# Patient Record
Sex: FEMALE | Race: WHITE | NOT HISPANIC OR LATINO | Employment: FULL TIME | URBAN - METROPOLITAN AREA
[De-identification: names, ages, dates, MRNs, and addresses within clinical notes are randomized per-mention and may not be internally consistent; named-entity substitution may affect disease eponyms.]

---

## 2023-05-26 PROBLEM — Z86.59 HISTORY OF OCD (OBSESSIVE COMPULSIVE DISORDER): Status: ACTIVE | Noted: 2023-05-26

## 2023-05-26 PROBLEM — E66.3 OVERWEIGHT (BMI 25.0-29.9): Status: ACTIVE | Noted: 2023-05-26

## 2023-05-26 PROBLEM — Q23.1 BICUSPID AORTIC VALVE (HHS-HCC): Status: ACTIVE | Noted: 2023-05-26

## 2023-05-26 PROBLEM — J30.2 SEASONAL ALLERGIES: Status: ACTIVE | Noted: 2023-05-26

## 2023-05-26 PROBLEM — F41.8 ANXIETY ASSOCIATED WITH DEPRESSION: Status: ACTIVE | Noted: 2023-05-26

## 2023-05-26 PROBLEM — N93.9 ABNORMAL UTERINE BLEEDING: Status: ACTIVE | Noted: 2023-05-26

## 2023-05-26 PROBLEM — Q23.81 BICUSPID AORTIC VALVE: Status: ACTIVE | Noted: 2023-05-26

## 2023-05-26 RX ORDER — BUSPIRONE HYDROCHLORIDE 10 MG/1
1 TABLET ORAL EVERY 12 HOURS
COMMUNITY
End: 2023-12-05 | Stop reason: WASHOUT

## 2023-05-26 RX ORDER — LAMOTRIGINE 150 MG/1
1 TABLET ORAL DAILY
COMMUNITY
End: 2023-12-05 | Stop reason: WASHOUT

## 2023-05-26 RX ORDER — IBUPROFEN 600 MG/1
TABLET ORAL
COMMUNITY
End: 2024-04-25 | Stop reason: WASHOUT

## 2023-05-26 RX ORDER — NORETHINDRONE ACETATE AND ETHINYL ESTRADIOL 1.5-30(21)
KIT ORAL
COMMUNITY
Start: 2022-08-02 | End: 2023-12-05 | Stop reason: WASHOUT

## 2023-05-26 RX ORDER — ARIPIPRAZOLE 20 MG/1
20 TABLET ORAL DAILY
COMMUNITY
End: 2023-12-05 | Stop reason: WASHOUT

## 2023-05-26 RX ORDER — BUPROPION HYDROCHLORIDE 300 MG/1
1 TABLET ORAL DAILY
COMMUNITY
End: 2023-12-05 | Stop reason: WASHOUT

## 2023-05-26 RX ORDER — LORATADINE 10 MG/1
2 TABLET ORAL DAILY
COMMUNITY

## 2023-05-26 RX ORDER — FLUOXETINE HYDROCHLORIDE 40 MG/1
2 CAPSULE ORAL DAILY
COMMUNITY
End: 2023-11-15 | Stop reason: WASHOUT

## 2023-05-26 RX ORDER — MULTIVITAMIN
1 TABLET ORAL DAILY
COMMUNITY

## 2023-06-05 ENCOUNTER — OFFICE VISIT (OUTPATIENT)
Dept: PRIMARY CARE | Facility: CLINIC | Age: 38
End: 2023-06-05
Payer: COMMERCIAL

## 2023-06-05 VITALS
SYSTOLIC BLOOD PRESSURE: 110 MMHG | HEART RATE: 62 BPM | RESPIRATION RATE: 20 BRPM | BODY MASS INDEX: 29.77 KG/M2 | DIASTOLIC BLOOD PRESSURE: 68 MMHG | HEIGHT: 63 IN | WEIGHT: 168 LBS

## 2023-06-05 DIAGNOSIS — Z00.00 ROUTINE GENERAL MEDICAL EXAMINATION AT A HEALTH CARE FACILITY: Primary | ICD-10-CM

## 2023-06-05 DIAGNOSIS — Z13.29 SCREENING FOR THYROID DISORDER: ICD-10-CM

## 2023-06-05 DIAGNOSIS — E66.3 OVERWEIGHT WITH BODY MASS INDEX (BMI) OF 29 TO 29.9 IN ADULT: ICD-10-CM

## 2023-06-05 DIAGNOSIS — Q23.1 BICUSPID AORTIC VALVE (HHS-HCC): ICD-10-CM

## 2023-06-05 DIAGNOSIS — J30.2 SEASONAL ALLERGIES: ICD-10-CM

## 2023-06-05 DIAGNOSIS — F41.8 ANXIETY ASSOCIATED WITH DEPRESSION: ICD-10-CM

## 2023-06-05 DIAGNOSIS — Z13.1 SCREENING FOR DIABETES MELLITUS: ICD-10-CM

## 2023-06-05 DIAGNOSIS — Z86.59 HISTORY OF OCD (OBSESSIVE COMPULSIVE DISORDER): ICD-10-CM

## 2023-06-05 DIAGNOSIS — Z13.220 SCREENING FOR LIPID DISORDERS: ICD-10-CM

## 2023-06-05 PROBLEM — N93.9 ABNORMAL UTERINE BLEEDING: Status: RESOLVED | Noted: 2023-05-26 | Resolved: 2023-06-05

## 2023-06-05 PROCEDURE — 3008F BODY MASS INDEX DOCD: CPT | Performed by: INTERNAL MEDICINE

## 2023-06-05 PROCEDURE — 99395 PREV VISIT EST AGE 18-39: CPT | Performed by: INTERNAL MEDICINE

## 2023-06-05 PROCEDURE — 1036F TOBACCO NON-USER: CPT | Performed by: INTERNAL MEDICINE

## 2023-06-05 ASSESSMENT — ENCOUNTER SYMPTOMS
CHOKING: 0
ARTHRALGIAS: 1
NECK PAIN: 0
DIFFICULTY URINATING: 0
CONSTIPATION: 1
COLOR CHANGE: 0
FREQUENCY: 0
WEAKNESS: 0
HYPERACTIVE: 0
BACK PAIN: 0
RECTAL PAIN: 0
APNEA: 0
DIARRHEA: 0
OCCASIONAL FEELINGS OF UNSTEADINESS: 0
PALPITATIONS: 0
SEIZURES: 0
NECK STIFFNESS: 0
ABDOMINAL DISTENTION: 0
LOSS OF SENSATION IN FEET: 0
VOICE CHANGE: 0
DECREASED CONCENTRATION: 0
BLOOD IN STOOL: 0
STRIDOR: 0
SLEEP DISTURBANCE: 0
WOUND: 0
DYSURIA: 0
NUMBNESS: 0
APPETITE CHANGE: 0
EYE ITCHING: 0
NERVOUS/ANXIOUS: 1
MYALGIAS: 0
COUGH: 0
SPEECH DIFFICULTY: 0
DEPRESSION: 0
FEVER: 0
BRUISES/BLEEDS EASILY: 0
EYE REDNESS: 0
RHINORRHEA: 0
UNEXPECTED WEIGHT CHANGE: 1
SHORTNESS OF BREATH: 0
FACIAL SWELLING: 0
ADENOPATHY: 0
FACIAL ASYMMETRY: 0
SORE THROAT: 0
DIAPHORESIS: 0
DIZZINESS: 0
SINUS PRESSURE: 0
AGITATION: 0
POLYDIPSIA: 0
JOINT SWELLING: 0
NAUSEA: 0
TROUBLE SWALLOWING: 0
ABDOMINAL PAIN: 0
WHEEZING: 0
EYE PAIN: 0
EYE DISCHARGE: 0
DYSPHORIC MOOD: 1
CONFUSION: 0
HEMATURIA: 0
CHILLS: 0
LIGHT-HEADEDNESS: 0
ACTIVITY CHANGE: 0
HEADACHES: 0
POLYPHAGIA: 0
CHEST TIGHTNESS: 0
TREMORS: 0
PHOTOPHOBIA: 0
FATIGUE: 0
VOMITING: 0
HALLUCINATIONS: 0
FLANK PAIN: 0
ANAL BLEEDING: 0
SINUS PAIN: 0

## 2023-06-05 ASSESSMENT — ACTIVITIES OF DAILY LIVING (ADL)
BATHING: INDEPENDENT
DOING_HOUSEWORK: INDEPENDENT
DRESSING: INDEPENDENT
TAKING_MEDICATION: INDEPENDENT
MANAGING_FINANCES: INDEPENDENT
GROCERY_SHOPPING: INDEPENDENT

## 2023-06-05 ASSESSMENT — PATIENT HEALTH QUESTIONNAIRE - PHQ9
1. LITTLE INTEREST OR PLEASURE IN DOING THINGS: NOT AT ALL
2. FEELING DOWN, DEPRESSED OR HOPELESS: NOT AT ALL
SUM OF ALL RESPONSES TO PHQ9 QUESTIONS 1 AND 2: 0

## 2023-06-05 NOTE — ASSESSMENT & PLAN NOTE
Encouraged patient to work on healthy diet and regular exercise  Likely weight gain is secondary to changes in psych med doses

## 2023-06-05 NOTE — ASSESSMENT & PLAN NOTE
Follows with psych  Had recent adjustments in last 1 month due to some suicidal ideations; these have since resolved

## 2023-06-05 NOTE — PROGRESS NOTES
Subjective   Patient ID: Monae aBll is a 38 y.o. female who presents for Annual Exam.    HPI  Pt here for full physical.  She hasn't been eating as well and is up in weight. She does not exercise more than walks at times.      She sees psych for her mood disorders.  She has visits every 3-4 months.  She had her doses adjusted with the Abilify going up.  She was having suicidal thoughts before dose adjustment.  This occurred about 1 month ago.  She is feeling better now with no further thoughts.      She sees Dr. Adler.  She has upcoming visit as last seen 8/2022.  She last had pap in 2021. No current GYN issues.  No breast issues.      Review of Systems   Constitutional:  Positive for unexpected weight change. Negative for activity change, appetite change, chills, diaphoresis, fatigue and fever.   HENT:  Negative for congestion, dental problem, drooling, ear discharge, ear pain, facial swelling, hearing loss, mouth sores, nosebleeds, postnasal drip, rhinorrhea, sinus pressure, sinus pain, sneezing, sore throat, tinnitus, trouble swallowing and voice change.    Eyes:  Positive for visual disturbance (wears glasses-had recent exam). Negative for photophobia, pain, discharge, redness and itching.   Respiratory:  Negative for apnea, cough, choking, chest tightness, shortness of breath, wheezing and stridor.    Cardiovascular:  Negative for chest pain, palpitations and leg swelling.   Gastrointestinal:  Positive for constipation. Negative for abdominal distention, abdominal pain, anal bleeding, blood in stool, diarrhea, nausea, rectal pain and vomiting.   Endocrine: Negative for cold intolerance, heat intolerance, polydipsia, polyphagia and polyuria.   Genitourinary:  Negative for decreased urine volume, difficulty urinating, dyspareunia, dysuria, enuresis, flank pain, frequency, genital sores, hematuria, menstrual problem, pelvic pain, urgency, vaginal bleeding, vaginal discharge and vaginal pain.  "  Musculoskeletal:  Positive for arthralgias (wrists at times). Negative for back pain, gait problem, joint swelling, myalgias, neck pain and neck stiffness.   Skin:  Negative for color change, pallor, rash and wound.   Allergic/Immunologic: Positive for environmental allergies. Negative for food allergies and immunocompromised state.   Neurological:  Negative for dizziness, tremors, seizures, syncope, facial asymmetry, speech difficulty, weakness, light-headedness, numbness and headaches.   Hematological:  Negative for adenopathy. Does not bruise/bleed easily.   Psychiatric/Behavioral:  Positive for dysphoric mood. Negative for agitation, behavioral problems, confusion, decreased concentration, hallucinations, self-injury, sleep disturbance and suicidal ideas. The patient is nervous/anxious. The patient is not hyperactive.        Objective   /68   Pulse 62   Resp 20   Ht 1.6 m (5' 3\")   Wt 76.2 kg (168 lb)   BMI 29.76 kg/m²    Physical Exam  Constitutional:       Appearance: Normal appearance.   HENT:      Head: Normocephalic and atraumatic.      Right Ear: Tympanic membrane, ear canal and external ear normal. There is no impacted cerumen.      Left Ear: Tympanic membrane, ear canal and external ear normal. There is no impacted cerumen.      Nose: Nose normal. No congestion or rhinorrhea.      Mouth/Throat:      Mouth: Mucous membranes are moist.      Pharynx: Oropharynx is clear. No oropharyngeal exudate or posterior oropharyngeal erythema.   Eyes:      Extraocular Movements: Extraocular movements intact.      Conjunctiva/sclera: Conjunctivae normal.      Pupils: Pupils are equal, round, and reactive to light.   Neck:      Vascular: No carotid bruit.   Cardiovascular:      Rate and Rhythm: Normal rate and regular rhythm.      Pulses: Normal pulses.      Heart sounds: Normal heart sounds. No murmur heard.  Pulmonary:      Effort: Pulmonary effort is normal. No respiratory distress.      Breath sounds: " Normal breath sounds. No wheezing, rhonchi or rales.   Abdominal:      General: Abdomen is flat. Bowel sounds are normal. There is no distension.      Palpations: Abdomen is soft.      Tenderness: There is no abdominal tenderness.      Hernia: No hernia is present.   Musculoskeletal:         General: No swelling or tenderness. Normal range of motion.      Cervical back: Normal range of motion and neck supple.      Right lower leg: No edema.      Left lower leg: No edema.   Lymphadenopathy:      Cervical: No cervical adenopathy.   Skin:     General: Skin is warm and dry.      Findings: No lesion or rash.   Neurological:      General: No focal deficit present.      Mental Status: She is alert and oriented to person, place, and time.      Cranial Nerves: No cranial nerve deficit.      Sensory: No sensory deficit.      Motor: No weakness.   Psychiatric:         Mood and Affect: Mood normal.         Behavior: Behavior normal.         Thought Content: Thought content normal.         Judgment: Judgment normal.         Assessment/Plan   Problem List Items Addressed This Visit          Circulatory    Bicuspid aortic valve       Endocrine/Metabolic    Overweight with body mass index (BMI) of 29 to 29.9 in adult     Encouraged patient to work on healthy diet and regular exercise  Likely weight gain is secondary to changes in psych med doses            Other    Anxiety associated with depression     Follows with psych  Had recent adjustments in last 1 month due to some suicidal ideations; these have since resolved          History of OCD (obsessive compulsive disorder)    Seasonal allergies     Other Visit Diagnoses       Routine general medical examination at a health care facility    -  Primary    Relevant Orders    CBC    Comprehensive Metabolic Panel    Urinalysis with Reflex Microscopic    Follow Up In Primary Care    Screening for lipid disorders        Relevant Orders    Lipid Panel    Screening for thyroid disorder         Relevant Orders    Thyroid Stimulating Hormone    Screening for diabetes mellitus        Relevant Orders    Hemoglobin A1C

## 2023-06-05 NOTE — PATIENT INSTRUCTIONS
Start to watch your diet more closely and start to exercise with goal of 150 minutes/week  Weight gain is most likely due to dose adjustments on the psych meds; your mental healthy however is much more important right now so continue to discuss this with psych  See GYN annually (due in August)   Get blood work and urine testing soon-please fast-no food after midnight can have water/black coffee/tea and medications in AM  Follow up here in 1 year-sooner if needed

## 2023-06-17 ENCOUNTER — LAB (OUTPATIENT)
Dept: LAB | Facility: LAB | Age: 38
End: 2023-06-17
Payer: COMMERCIAL

## 2023-06-17 DIAGNOSIS — Z13.29 SCREENING FOR THYROID DISORDER: ICD-10-CM

## 2023-06-17 DIAGNOSIS — Z13.1 SCREENING FOR DIABETES MELLITUS: ICD-10-CM

## 2023-06-17 DIAGNOSIS — Z00.00 ROUTINE GENERAL MEDICAL EXAMINATION AT A HEALTH CARE FACILITY: ICD-10-CM

## 2023-06-17 DIAGNOSIS — Z13.220 SCREENING FOR LIPID DISORDERS: ICD-10-CM

## 2023-06-17 LAB
ALANINE AMINOTRANSFERASE (SGPT) (U/L) IN SER/PLAS: 11 U/L (ref 7–45)
ALBUMIN (G/DL) IN SER/PLAS: 4.3 G/DL (ref 3.4–5)
ALKALINE PHOSPHATASE (U/L) IN SER/PLAS: 66 U/L (ref 33–110)
ANION GAP IN SER/PLAS: 16 MMOL/L (ref 10–20)
APPEARANCE, URINE: ABNORMAL
ASPARTATE AMINOTRANSFERASE (SGOT) (U/L) IN SER/PLAS: 17 U/L (ref 9–39)
BACTERIA, URINE: ABNORMAL /HPF
BILIRUBIN TOTAL (MG/DL) IN SER/PLAS: 0.4 MG/DL (ref 0–1.2)
BILIRUBIN, URINE: NEGATIVE
BLOOD, URINE: NEGATIVE
CALCIUM (MG/DL) IN SER/PLAS: 9.2 MG/DL (ref 8.6–10.6)
CARBON DIOXIDE, TOTAL (MMOL/L) IN SER/PLAS: 23 MMOL/L (ref 21–32)
CHLORIDE (MMOL/L) IN SER/PLAS: 104 MMOL/L (ref 98–107)
CHOLESTEROL (MG/DL) IN SER/PLAS: 183 MG/DL (ref 0–199)
CHOLESTEROL IN HDL (MG/DL) IN SER/PLAS: 53.3 MG/DL
CHOLESTEROL/HDL RATIO: 3.4
COLOR, URINE: YELLOW
CREATININE (MG/DL) IN SER/PLAS: 0.79 MG/DL (ref 0.5–1.05)
ERYTHROCYTE DISTRIBUTION WIDTH (RATIO) BY AUTOMATED COUNT: 12.3 % (ref 11.5–14.5)
ERYTHROCYTE MEAN CORPUSCULAR HEMOGLOBIN CONCENTRATION (G/DL) BY AUTOMATED: 33.2 G/DL (ref 32–36)
ERYTHROCYTE MEAN CORPUSCULAR VOLUME (FL) BY AUTOMATED COUNT: 94 FL (ref 80–100)
ERYTHROCYTES (10*6/UL) IN BLOOD BY AUTOMATED COUNT: 4.5 X10E12/L (ref 4–5.2)
GFR FEMALE: >90 ML/MIN/1.73M2
GLUCOSE (MG/DL) IN SER/PLAS: 83 MG/DL (ref 74–99)
GLUCOSE, URINE: NEGATIVE MG/DL
HEMATOCRIT (%) IN BLOOD BY AUTOMATED COUNT: 42.2 % (ref 36–46)
HEMOGLOBIN (G/DL) IN BLOOD: 14 G/DL (ref 12–16)
KETONES, URINE: NEGATIVE MG/DL
LDL: 102 MG/DL (ref 0–99)
LEUKOCYTE ESTERASE, URINE: ABNORMAL
LEUKOCYTES (10*3/UL) IN BLOOD BY AUTOMATED COUNT: 8.7 X10E9/L (ref 4.4–11.3)
NITRITE, URINE: NEGATIVE
NRBC (PER 100 WBCS) BY AUTOMATED COUNT: 0 /100 WBC (ref 0–0)
PH, URINE: 6 (ref 5–8)
PLATELETS (10*3/UL) IN BLOOD AUTOMATED COUNT: 285 X10E9/L (ref 150–450)
POTASSIUM (MMOL/L) IN SER/PLAS: 4.2 MMOL/L (ref 3.5–5.3)
PROTEIN TOTAL: 6.8 G/DL (ref 6.4–8.2)
PROTEIN, URINE: NEGATIVE MG/DL
RBC, URINE: <1 /HPF (ref 0–5)
SODIUM (MMOL/L) IN SER/PLAS: 139 MMOL/L (ref 136–145)
SPECIFIC GRAVITY, URINE: 1.01 (ref 1–1.03)
SQUAMOUS EPITHELIAL CELLS, URINE: 15 /HPF
THYROTROPIN (MIU/L) IN SER/PLAS BY DETECTION LIMIT <= 0.05 MIU/L: 1.54 MIU/L (ref 0.44–3.98)
TRIGLYCERIDE (MG/DL) IN SER/PLAS: 140 MG/DL (ref 0–149)
UREA NITROGEN (MG/DL) IN SER/PLAS: 11 MG/DL (ref 6–23)
UROBILINOGEN, URINE: <2 MG/DL (ref 0–1.9)
VLDL: 28 MG/DL (ref 0–40)
WBC, URINE: 3 /HPF (ref 0–5)

## 2023-06-17 PROCEDURE — 80053 COMPREHEN METABOLIC PANEL: CPT

## 2023-06-17 PROCEDURE — 84443 ASSAY THYROID STIM HORMONE: CPT

## 2023-06-17 PROCEDURE — 83036 HEMOGLOBIN GLYCOSYLATED A1C: CPT

## 2023-06-17 PROCEDURE — 36415 COLL VENOUS BLD VENIPUNCTURE: CPT

## 2023-06-17 PROCEDURE — 85027 COMPLETE CBC AUTOMATED: CPT

## 2023-06-17 PROCEDURE — 81001 URINALYSIS AUTO W/SCOPE: CPT

## 2023-06-17 PROCEDURE — 80061 LIPID PANEL: CPT

## 2023-06-19 LAB
ESTIMATED AVERAGE GLUCOSE FOR HBA1C: 97 MG/DL
HEMOGLOBIN A1C/HEMOGLOBIN TOTAL IN BLOOD: 5 %

## 2023-11-03 PROBLEM — N93.9 ABNORMAL UTERINE BLEEDING: Status: ACTIVE | Noted: 2023-11-03

## 2023-11-03 PROBLEM — F32.0 CURRENT MILD EPISODE OF MAJOR DEPRESSIVE DISORDER (CMS-HCC): Status: ACTIVE | Noted: 2023-11-03

## 2023-11-03 PROBLEM — F41.1 GENERALIZED ANXIETY DISORDER: Status: ACTIVE | Noted: 2017-03-24

## 2023-11-03 PROBLEM — R46.81 OBSESSIVE-COMPULSIVE BEHAVIOR: Status: ACTIVE | Noted: 2023-11-03

## 2023-11-03 PROBLEM — E66.3 OVERWEIGHT (BMI 25.0-29.9): Status: ACTIVE | Noted: 2023-11-03

## 2023-11-03 PROBLEM — F32.9 MAJOR DEPRESSION: Status: ACTIVE | Noted: 2023-11-03

## 2023-11-03 RX ORDER — LORATADINE 10 MG/1
10 TABLET ORAL
COMMUNITY
Start: 2014-09-29

## 2023-11-03 RX ORDER — BUPROPION HYDROCHLORIDE 150 MG/1
1 TABLET ORAL DAILY
COMMUNITY
Start: 2015-12-01 | End: 2023-12-05 | Stop reason: WASHOUT

## 2023-11-03 RX ORDER — FLUOXETINE HYDROCHLORIDE 20 MG/1
2 CAPSULE ORAL DAILY
COMMUNITY
Start: 2016-08-24 | End: 2023-11-15 | Stop reason: SDUPTHER

## 2023-11-03 RX ORDER — LAMOTRIGINE 100 MG/1
100 TABLET ORAL 2 TIMES DAILY
COMMUNITY
Start: 2020-10-27 | End: 2023-12-05 | Stop reason: WASHOUT

## 2023-11-03 RX ORDER — BUPROPION HYDROCHLORIDE 75 MG/1
75 TABLET ORAL
COMMUNITY
Start: 2016-01-14 | End: 2023-12-05 | Stop reason: WASHOUT

## 2023-11-03 RX ORDER — FLUOXETINE HYDROCHLORIDE 40 MG/1
80 CAPSULE ORAL
COMMUNITY
Start: 2020-10-27 | End: 2023-11-15 | Stop reason: WASHOUT

## 2023-11-03 RX ORDER — IBUPROFEN 600 MG/1
600 TABLET ORAL
COMMUNITY
Start: 2013-11-17 | End: 2024-04-25 | Stop reason: WASHOUT

## 2023-11-03 RX ORDER — NORGESTIMATE AND ETHINYL ESTRADIOL 0.25-0.035
1 KIT ORAL
COMMUNITY
Start: 2020-12-22 | End: 2023-12-05 | Stop reason: WASHOUT

## 2023-11-03 RX ORDER — NORGESTIMATE AND ETHINYL ESTRADIOL 0.25-0.035
1 KIT ORAL
COMMUNITY
Start: 2015-06-30 | End: 2023-12-05 | Stop reason: WASHOUT

## 2023-11-03 RX ORDER — BUPROPION HYDROCHLORIDE 300 MG/1
300 TABLET ORAL
COMMUNITY
Start: 2020-10-27 | End: 2023-12-05 | Stop reason: WASHOUT

## 2023-11-08 ENCOUNTER — APPOINTMENT (OUTPATIENT)
Dept: BEHAVIORAL HEALTH | Facility: CLINIC | Age: 38
End: 2023-11-08
Payer: COMMERCIAL

## 2023-11-15 ENCOUNTER — PHARMACY VISIT (OUTPATIENT)
Dept: PHARMACY | Facility: CLINIC | Age: 38
End: 2023-11-15
Payer: COMMERCIAL

## 2023-11-15 DIAGNOSIS — F41.1 GAD (GENERALIZED ANXIETY DISORDER): ICD-10-CM

## 2023-11-15 DIAGNOSIS — F41.8 ANXIETY ASSOCIATED WITH DEPRESSION: ICD-10-CM

## 2023-11-15 RX ORDER — FLUOXETINE HYDROCHLORIDE 40 MG/1
80 CAPSULE ORAL DAILY
Qty: 180 CAPSULE | Refills: 0 | Status: SHIPPED | OUTPATIENT
Start: 2023-11-15 | End: 2023-11-15 | Stop reason: WASHOUT

## 2023-11-15 RX ORDER — FLUOXETINE HYDROCHLORIDE 20 MG/1
40 CAPSULE ORAL DAILY
Qty: 180 CAPSULE | Refills: 0 | Status: SHIPPED | OUTPATIENT
Start: 2023-11-15 | End: 2023-11-17 | Stop reason: SDUPTHER

## 2023-11-15 NOTE — PROGRESS NOTES
Non billable note :Reviewed most recent note after patient called writer and left a message requesting a call back to schedule a follow up appointment and requesting a refill on fluoxetine , ordered fluoxetine script and communicated with staff regarding schedule need . [facility]

## 2023-11-17 DIAGNOSIS — F41.1 GAD (GENERALIZED ANXIETY DISORDER): ICD-10-CM

## 2023-11-17 RX ORDER — FLUOXETINE HYDROCHLORIDE 40 MG/1
40 CAPSULE ORAL 2 TIMES DAILY
Qty: 60 CAPSULE | Refills: 1 | Status: SHIPPED | OUTPATIENT
Start: 2023-11-17 | End: 2023-12-05 | Stop reason: SDUPTHER

## 2023-11-18 ENCOUNTER — PHARMACY VISIT (OUTPATIENT)
Dept: PHARMACY | Facility: CLINIC | Age: 38
End: 2023-11-18
Payer: COMMERCIAL

## 2023-11-19 RX ORDER — BUSPIRONE HYDROCHLORIDE 10 MG/1
10 TABLET ORAL 2 TIMES DAILY
COMMUNITY
Start: 2020-10-27 | End: 2023-12-05 | Stop reason: WASHOUT

## 2023-11-19 RX ORDER — ARIPIPRAZOLE 2 MG/1
2 TABLET ORAL
COMMUNITY
Start: 2020-10-27 | End: 2023-12-05 | Stop reason: SDUPTHER

## 2023-11-20 ENCOUNTER — PHARMACY VISIT (OUTPATIENT)
Dept: PHARMACY | Facility: CLINIC | Age: 38
End: 2023-11-20
Payer: COMMERCIAL

## 2023-11-20 PROCEDURE — RXMED WILLOW AMBULATORY MEDICATION CHARGE

## 2023-12-05 ENCOUNTER — TELEMEDICINE (OUTPATIENT)
Dept: BEHAVIORAL HEALTH | Facility: CLINIC | Age: 38
End: 2023-12-05
Payer: COMMERCIAL

## 2023-12-05 DIAGNOSIS — F33.0 MILD EPISODE OF RECURRENT MAJOR DEPRESSIVE DISORDER (CMS-HCC): ICD-10-CM

## 2023-12-05 DIAGNOSIS — F41.1 GAD (GENERALIZED ANXIETY DISORDER): ICD-10-CM

## 2023-12-05 PROCEDURE — 3008F BODY MASS INDEX DOCD: CPT | Performed by: CLINICAL NURSE SPECIALIST

## 2023-12-05 PROCEDURE — 99213 OFFICE O/P EST LOW 20 MIN: CPT | Performed by: CLINICAL NURSE SPECIALIST

## 2023-12-05 RX ORDER — BUSPIRONE HYDROCHLORIDE 15 MG/1
15 TABLET ORAL 2 TIMES DAILY
Qty: 180 TABLET | Refills: 0 | Status: SHIPPED | OUTPATIENT
Start: 2023-12-05 | End: 2024-03-22 | Stop reason: SDUPTHER

## 2023-12-05 RX ORDER — LAMOTRIGINE 100 MG/1
100 TABLET ORAL 2 TIMES DAILY
Qty: 180 TABLET | Refills: 0 | Status: SHIPPED | OUTPATIENT
Start: 2023-12-05 | End: 2024-03-22 | Stop reason: SDUPTHER

## 2023-12-05 RX ORDER — BUPROPION HYDROCHLORIDE 300 MG/1
300 TABLET ORAL
Qty: 90 TABLET | Refills: 0 | Status: SHIPPED | OUTPATIENT
Start: 2023-12-05 | End: 2024-03-22 | Stop reason: SDUPTHER

## 2023-12-05 RX ORDER — ARIPIPRAZOLE 2 MG/1
2 TABLET ORAL
Qty: 90 TABLET | Refills: 0 | Status: SHIPPED | OUTPATIENT
Start: 2023-12-05 | End: 2023-12-05 | Stop reason: WASHOUT

## 2023-12-05 RX ORDER — FLUOXETINE HYDROCHLORIDE 40 MG/1
80 CAPSULE ORAL DAILY
Qty: 180 CAPSULE | Refills: 0 | Status: SHIPPED | OUTPATIENT
Start: 2023-12-05 | End: 2024-03-22 | Stop reason: SDUPTHER

## 2023-12-05 RX ORDER — ARIPIPRAZOLE 20 MG/1
20 TABLET ORAL
Qty: 90 TABLET | Refills: 0 | Status: SHIPPED | OUTPATIENT
Start: 2023-12-05 | End: 2024-03-22 | Stop reason: SDUPTHER

## 2023-12-05 NOTE — PROGRESS NOTES
Outpatient Psychiatry      Subjective   Monae Ball, a 38 y.o. female,     Diagnosis:          MATTI (generalized anxiety disorder) (300.02) (F41.1)   · Major depression (296.20) (F32.9)    Treatment Goals:  Specify outcomes written in observable, behavioral terms:   Anxiety: reducing negative automatic thoughts and reducing physical symptoms of anxiety    Treatment Plan/Recommendations:   10-12 weeks follow up can continue self care and wellness efforts and maintain routine health screenings , can call  for treatment and scheduling concerns   Follow-up plan for depression was discussed with patient.    Review with patient: Treatment plan reviewed with the patient.  Medication risks/benefit reviewed with the patient    History of Present Illness  [facility]   she lives with her wife   she is working from home   abilify has been on regimen for three years   lamotrigine was added to regimen about 4-5 years ago , when she was having issues with anxiety and intrusive thoughts and some suicidal thoughts   bupropion xl has been on regimen for 4-5 years   prozac has been on regimen for 4-5 years , at a consistent schedule   she has gotten at times intrusive thoughts of harming herself , no thoughts of harming others per current thoughts per patient report or per reported history  , describes these thoughts as rumination , she uses distraction, agrees if this becomes an issue , to go to the er or to call [facility] crisis hotline , she has not had these recently , has gotten other general intrusive thoughts , discussed going to the er or [facility] if needed for assessment , discussed how to contact the office , both during office hours and that there is an on call psychiatric provider on call after hours   her sleep is good initially , sometimes she has anxiety interferes with her sleep , and she sometimes struggles with walking up due to fatigue and low mood , low motivation    no mood cycling presently  Reconciled and reviewed medication list in the [facility] emr       Review of Systems     Depressive Symptoms: depressed /irritable mood, fatigue, poor concentration, but no loss of interest, no change in appetite, no recent lb weight gain, no recent lb weight loss, no insomnia, not feeling worthless or guilty, ability to make decisions, no suicidal ideation, no guns or weapons in household.   Manic Symptoms: mood is not irritable or elevated, self esteem is not grandiose or increased, no changes in need for sleep, not more talkative than usual, does not have flight of ideas or racing thoughts, no distractibility, no psychomotor agitation or increased goal-directed activity, no excessive involvement in pleasurable activities.   Psychotic Symptoms: no hallucinations, no delusions, no disorganized speech, does not have disorganized behavior or catatonia, no negative symptoms.   Anxiety Symptoms: excessive worry, difficulty controlling worry, increased arousal, obsessions (recurrent thoughts, impulses, or images), but no panic attacks, no concerns about future panic attacks, no worry about panic attack consequences, no change in behavior due to panic attacks, not easily fatigued due to worry, no difficulty concentrating due to worry, no irritability due to worry, no muscle tension due to worry, no sleep disturbances due to worry, no specific phobia, no social phobia, no compulsions, no exposure to traumatic event, no re-experiencing of traumatic event, no avoidance of stimuli and number of responsiveness, no restlessness / feeling on edge due to worry.   Delirium/ Altered Mental Status Symptoms: no disturbances of consciousness, no diminished ability to focus, sustain, shift attention, no change in cognition or perceptual disturbances, symptoms do not fluctuate during the course of the day, general medical condition is not present.   Disordered Eating Symptoms: weight is not less than 85% of  ideal body weight, no intense fear of gaining weight, does not have a poor body image, no restricting of diet and/or excessive exercise, no purging or laxative use.   Post-traumatic stress disorder symptoms The patient is currently asymptomatic.   Inattentive Symptoms: does not make careless mistakes often, does not have difficulty paying attention, not often disorganized, does not lose things often, is not easily distracted, is not often forgetful, does not avoid/dislike tasks with sustained mental effort, listens when spoken to directly, is able to follow instructions and finish schoolwork.   Conduct Issues: no aggression towards people or animals, no destruction of property, no deceitfulness, does not violate rules.   Other Symptoms/ Concerns: no symptoms of separation anxiety, no reactive attachment symptoms, no motor tics, no vocal tics, no stuttering, no phonological problems, no loss of urine control, no encopresis, no intellectual disability, no self-injurious behaviors, not somatic and no conversion symptoms, no gender identity symptoms, no sleep disorder symptoms, no impulse control symptoms, no personality disorder symptoms.       Constitutional: no sleep apnea, normal sleeping, no night wakings, no snoring, not a picky eater, normal appetite, no swallowing problems, no night terrors, no nightmares, no restless sleep, no snorts/gasps and no obesity.   Eyes: no vision test, no vision impairment, does not wear glasses/contacts, does not wear glassess/contacts and no blindness.   ENT: no hearing tested, no hearing loss, no hearing aid, no cochlear implant, no excessive drooling, no dental problems and no recurrent strep throat.   Cardiovascular: no murmur, no heart defect, no chest pain, no palpitations and no syncope.   Respiratory: no wheezing and no asthma/reactive airway disease.   Gastrointestinal: no constipation, no abdominal pain, no nausea, no vomiting, no diarrhea, no blood in stools, no g-tube and  no reflux.   Genitourinary: no nocturnal enuresis, no diurnal enuresis and no incontinence.   Musculoskeletal: normal gait and no torticollis, but moving all extremities well and symmetrical, no arthritis or joint problems, no myalgias, no muscle weakness and normal hand preference.   Integumentary: no changes in moles or birthmarks, no rashes and no atopic dermatitis.   Neurological: no symmetrical facies, no headache, no head injury, no seizures, no staring spells, no loss of consciousness, no meningitis/encephalitis, no cerebral palsy, no spina bifida, no stereotypy, no developmental regression and no tics or twitches.   Endocrine: no temperature intolerance, , good growth and no failure to thrive.   Hematologic/Lymphatic: no anemia and no lead poisoning.        Medications psychotropic:  Anxiety associated with depression    · Renew: lamoTRIgine 100 MG Oral Tablet; Take 1 tablet twice a day  MATTI (generalized anxiety disorder)    · Renew: busPIRone HCl - 15 MG Oral Tablet; 1 tablet twice a day  Major depression    · Start: buPROPion HCl ER (XL) 150 MG Oral Tablet Extended Release 24 Hour; 1 tablet  daily each morning , take along with 300 mg , xl daily   · Renew: ARIPiprazole 20 MG Oral Tablet; 1 tablet daily each morning   · Renew: buPROPion HCl ER (XL) 300 MG Oral Tablet Extended Release 24 Hour; TAKE 1  TABLET DAILY each morning  Obsessive-compulsive behavior    · Renew: FLUoxetine HCl - 40 MG Oral Capsule (PROzac); TAKE 2 CAPSULES DAILY  EACH MORNING        Current Outpatient Medications:     ARIPiprazole (Abilify) 2 mg tablet, Take 1 tablet (2 mg) by mouth once daily., Disp: , Rfl:     busPIRone (Buspar) 10 mg tablet, Take 1 tablet (10 mg) by mouth twice a day., Disp: , Rfl:     ARIPiprazole (Abilify) 20 mg tablet, Take 1 tablet (20 mg) by mouth once daily., Disp: , Rfl:     ARIPiprazole (Abilify) 20 mg tablet, TAKE 1 TABLET BY MOUTH EVERY MORNING, Disp: 90 tablet, Rfl: 0    ARIPiprazole (Abilify) 20 mg  tablet, TAKE 1 TABLET BY MOUTH EVERY MORNING, Disp: 90 tablet, Rfl: 0    ARIPiprazole (Abilify) 20 mg tablet, TAKE 1 TABLET BY MOUTH EVERY MORNING, Disp: 90 tablet, Rfl: 0    buPROPion (Wellbutrin) 75 mg tablet, Take 1 tablet (75 mg) by mouth once daily., Disp: , Rfl:     buPROPion XL (Wellbutrin XL) 150 mg 24 hr tablet, TAKE 1 TABLET BY MOUTH EVERY MORNING WITH 300 MG AS DIRECTED, Disp: 90 tablet, Rfl: 0    buPROPion XL (Wellbutrin XL) 150 mg 24 hr tablet, Take 1 tablet (150 mg) by mouth once daily., Disp: , Rfl:     buPROPion XL (Wellbutrin XL) 300 mg 24 hr tablet, Take 1 tablet (300 mg) by mouth once daily., Disp: , Rfl:     buPROPion XL (Wellbutrin XL) 300 mg 24 hr tablet, TAKE 1 TABLET BY MOUTH EVERY MORNING, Disp: 90 tablet, Rfl: 0    buPROPion XL (Wellbutrin XL) 300 mg 24 hr tablet, TAKE 1 TABLET BY MOUTH EVERY MORNING, Disp: 90 tablet, Rfl: 0    buPROPion XL (Wellbutrin XL) 300 mg 24 hr tablet, TAKE 1 TABLET BY MOUTH ONCE DAILY IN THE MORNING, Disp: 90 tablet, Rfl: 0    buPROPion XL (Wellbutrin XL) 300 mg 24 hr tablet, Take 1 tablet (300 mg) by mouth once daily., Disp: , Rfl:     busPIRone (Buspar) 10 mg tablet, Take 1 tablet (10 mg) by mouth every 12 hours., Disp: , Rfl:     busPIRone (Buspar) 10 mg tablet, TAKE 1 TABLET BY MOUTH EVERY 12 HOURS DAILY, Disp: 180 tablet, Rfl: 0    busPIRone (Buspar) 10 mg tablet, TAKE 1 TABLET BY MOUTH THREE TIMES A DAY, Disp: 90 tablet, Rfl: 2    busPIRone (Buspar) 15 mg tablet, TAKE 1 TABLET BY MOUTH TWO TIMES A DAY, Disp: 180 tablet, Rfl: 0    busPIRone (Buspar) 15 mg tablet, TAKE 1 TABLET BY MOUTH TWO TIMES A DAY, Disp: 180 tablet, Rfl: 0    FLUoxetine (PROzac) 40 mg capsule, Take 1 capsule (40 mg) by mouth 2 times a day., Disp: 60 capsule, Rfl: 1    ibuprofen 600 mg tablet, Take by mouth., Disp: , Rfl:     ibuprofen 600 mg tablet, Take 1 tablet (600 mg) by mouth., Disp: , Rfl:     lamoTRIgine (LaMICtal) 100 mg tablet, TAKE 1 TABLET BY MOUTH TWO TIMES A DAY, Disp: 180  tablet, Rfl: 0    lamoTRIgine (LaMICtal) 100 mg tablet, TAKE 1 TABLET BY MOUTH TWO TIMES A DAY, Disp: 180 tablet, Rfl: 0    lamoTRIgine (LaMICtal) 100 mg tablet, TAKE 1 TABLET BY MOUTH TWO TIMES A DAY, Disp: 180 tablet, Rfl: 0    lamoTRIgine (LaMICtal) 100 mg tablet, Take 1 tablet (100 mg) by mouth twice a day., Disp: , Rfl:     lamoTRIgine (LaMICtal) 150 mg tablet, Take 1 tablet (150 mg) by mouth once daily., Disp: , Rfl:     loratadine (Claritin) 10 mg tablet, Take 2 tablets (20 mg) by mouth once daily., Disp: , Rfl:     loratadine (Claritin) 10 mg tablet, Take 1 tablet (10 mg) by mouth., Disp: , Rfl:     multivitamin tablet, Take 1 tablet by mouth once daily., Disp: , Rfl:     norethindrone-e.estradioL-iron (Microgestin FE 1.5/30) 1.5 mg-30 mcg (21)/75 mg (7) tablet, Take by mouth., Disp: , Rfl:     norethindrone-e.estradioL-iron (Microgestin FE 1.5/30) 1.5 mg-30 mcg (21)/75 mg (7) tablet, TAKE 1 TABLET BY MOUTH ONCE DAILY AS DIRECTED, Disp: 84 tablet, Rfl: 3    norethindrone-e.estradioL-iron (Microgestin FE 1.5/30) 1.5 mg-30 mcg (21)/75 mg (7) tablet, TAKE 1 TABLET BY MOUTH ONCE DAILY AS DIRECTED, Disp: 84 tablet, Rfl: 0    norgestimate-ethinyl estradioL (Ortho-Cyclen) 0.25-35 mg-mcg tablet, Take 1 tablet by mouth once daily., Disp: , Rfl:     norgestimate-ethinyl estradioL (Ortho-Cyclen) 0.25-35 mg-mcg tablet, Take 1 tablet by mouth once daily., Disp: , Rfl:   Medical History:  Past Medical History:   Diagnosis Date    Abnormal uterine bleeding 05/26/2023    Other seasonal allergic rhinitis 05/27/2022    Seasonal allergies    Other specified anxiety disorders     Depression with anxiety    Personal history of other diseases of the nervous system and sense organs     History of migraine     Surgical History:  Past Surgical History:   Procedure Laterality Date    OTHER SURGICAL HISTORY  11/18/2020    [place] tooth extraction     Family History:  No family history on file.  Social History:  Social History      Socioeconomic History    Marital status:      Spouse name: Liz Jaeger    Number of children: Not on file    Years of education: Not on file    Highest education level: Not on file   Occupational History    Occupation:    Tobacco Use    Smoking status: Never    Smokeless tobacco: Never   Vaping Use    Vaping Use: Never used   Substance and Sexual Activity    Alcohol use: Not Currently     Alcohol/week: 0.0 - 4.0 standard drinks of alcohol    Drug use: Never    Sexual activity: Yes     Partners: Female     Birth control/protection: OCP   Other Topics Concern    Not on file   Social History Narrative    Not on file     Social Determinants of Health     Financial Resource Strain: Not on file   Food Insecurity: Not on file   Transportation Needs: Not on file   Physical Activity: Not on file   Stress: Not on file   Social Connections: Not on file   Intimate Partner Violence: Not on file   Housing Stability: Not on file     Record Review: brief     Vitals:  There were no vitals filed for this visit.    Yumiko Barbosa, APRN-CNS

## 2023-12-18 PROCEDURE — RXMED WILLOW AMBULATORY MEDICATION CHARGE

## 2023-12-21 ENCOUNTER — PHARMACY VISIT (OUTPATIENT)
Dept: PHARMACY | Facility: CLINIC | Age: 38
End: 2023-12-21
Payer: COMMERCIAL

## 2023-12-29 PROCEDURE — RXMED WILLOW AMBULATORY MEDICATION CHARGE

## 2024-01-02 ENCOUNTER — TELEPHONE (OUTPATIENT)
Dept: PRIMARY CARE | Facility: CLINIC | Age: 39
End: 2024-01-02
Payer: COMMERCIAL

## 2024-01-02 DIAGNOSIS — N93.9 ABNORMAL UTERINE BLEEDING: Primary | ICD-10-CM

## 2024-01-02 RX ORDER — NORETHINDRONE ACETATE AND ETHINYL ESTRADIOL 1.5-30(21)
1 KIT ORAL DAILY
Qty: 84 TABLET | Refills: 3 | Status: SHIPPED | OUTPATIENT
Start: 2024-01-02 | End: 2025-01-01

## 2024-01-02 NOTE — TELEPHONE ENCOUNTER
Adriana has had a cough since last week, her voice is going too, sore throat too,   took COVID was negative, taking vitamins and tylenol. Can not take Sudafed. Asking what she should be taking?    Please advise

## 2024-01-02 NOTE — TELEPHONE ENCOUNTER
Mucinex 600 mg twice a day  Throat lozenges  Tylenol/ibuprofen as needed  Rest, push fluids  Lots of viral URI going around lasting 10-14 days and cough lasting 1-2 months

## 2024-01-03 ENCOUNTER — PHARMACY VISIT (OUTPATIENT)
Dept: PHARMACY | Facility: CLINIC | Age: 39
End: 2024-01-03
Payer: COMMERCIAL

## 2024-01-03 PROCEDURE — RXMED WILLOW AMBULATORY MEDICATION CHARGE

## 2024-01-05 ENCOUNTER — PHARMACY VISIT (OUTPATIENT)
Dept: PHARMACY | Facility: CLINIC | Age: 39
End: 2024-01-05
Payer: COMMERCIAL

## 2024-02-12 ENCOUNTER — APPOINTMENT (OUTPATIENT)
Dept: BEHAVIORAL HEALTH | Facility: CLINIC | Age: 39
End: 2024-02-12
Payer: COMMERCIAL

## 2024-02-19 ENCOUNTER — APPOINTMENT (OUTPATIENT)
Dept: BEHAVIORAL HEALTH | Facility: CLINIC | Age: 39
End: 2024-02-19
Payer: COMMERCIAL

## 2024-03-22 ENCOUNTER — PHARMACY VISIT (OUTPATIENT)
Dept: PHARMACY | Facility: CLINIC | Age: 39
End: 2024-03-22
Payer: COMMERCIAL

## 2024-03-22 ENCOUNTER — DOCUMENTATION (OUTPATIENT)
Dept: BEHAVIORAL HEALTH | Facility: CLINIC | Age: 39
End: 2024-03-22
Payer: COMMERCIAL

## 2024-03-22 DIAGNOSIS — F33.0 MILD EPISODE OF RECURRENT MAJOR DEPRESSIVE DISORDER (CMS-HCC): ICD-10-CM

## 2024-03-22 DIAGNOSIS — F41.1 GAD (GENERALIZED ANXIETY DISORDER): ICD-10-CM

## 2024-03-22 PROCEDURE — RXMED WILLOW AMBULATORY MEDICATION CHARGE

## 2024-03-22 RX ORDER — FLUOXETINE HYDROCHLORIDE 40 MG/1
80 CAPSULE ORAL DAILY
Qty: 180 CAPSULE | Refills: 0 | Status: SHIPPED | OUTPATIENT
Start: 2024-03-22 | End: 2024-03-28 | Stop reason: SDUPTHER

## 2024-03-22 RX ORDER — BUPROPION HYDROCHLORIDE 300 MG/1
300 TABLET ORAL
Qty: 90 TABLET | Refills: 0 | Status: SHIPPED | OUTPATIENT
Start: 2024-03-22 | End: 2024-03-28 | Stop reason: SDUPTHER

## 2024-03-22 RX ORDER — BUSPIRONE HYDROCHLORIDE 15 MG/1
15 TABLET ORAL 2 TIMES DAILY
Qty: 180 TABLET | Refills: 0 | Status: SHIPPED | OUTPATIENT
Start: 2024-03-22 | End: 2024-03-28 | Stop reason: SDUPTHER

## 2024-03-22 RX ORDER — LAMOTRIGINE 100 MG/1
100 TABLET ORAL 2 TIMES DAILY
Qty: 180 TABLET | Refills: 0 | Status: SHIPPED | OUTPATIENT
Start: 2024-03-22 | End: 2024-03-28 | Stop reason: SDUPTHER

## 2024-03-22 RX ORDER — ARIPIPRAZOLE 20 MG/1
20 TABLET ORAL
Qty: 90 TABLET | Refills: 0 | Status: SHIPPED | OUTPATIENT
Start: 2024-03-22 | End: 2024-03-28 | Stop reason: SDUPTHER

## 2024-03-22 NOTE — PROGRESS NOTES
Nonbillable note : reviewed [facility] emr and sent med refills per my chart request after review of [facility] emr, med history of orders and communicated with staff that patient needs to schedule for follow up within one month [facility]

## 2024-03-28 ENCOUNTER — APPOINTMENT (OUTPATIENT)
Dept: BEHAVIORAL HEALTH | Facility: CLINIC | Age: 39
End: 2024-03-28
Payer: COMMERCIAL

## 2024-03-28 ENCOUNTER — TELEMEDICINE (OUTPATIENT)
Dept: BEHAVIORAL HEALTH | Facility: CLINIC | Age: 39
End: 2024-03-28
Payer: COMMERCIAL

## 2024-03-28 DIAGNOSIS — F41.1 GAD (GENERALIZED ANXIETY DISORDER): ICD-10-CM

## 2024-03-28 DIAGNOSIS — F33.0 MILD EPISODE OF RECURRENT MAJOR DEPRESSIVE DISORDER (CMS-HCC): ICD-10-CM

## 2024-03-28 PROCEDURE — 99213 OFFICE O/P EST LOW 20 MIN: CPT | Performed by: CLINICAL NURSE SPECIALIST

## 2024-03-28 PROCEDURE — 3008F BODY MASS INDEX DOCD: CPT | Performed by: CLINICAL NURSE SPECIALIST

## 2024-03-28 RX ORDER — LAMOTRIGINE 100 MG/1
100 TABLET ORAL 2 TIMES DAILY
Qty: 180 TABLET | Refills: 0 | Status: SHIPPED | OUTPATIENT
Start: 2024-03-28 | End: 2024-04-25 | Stop reason: SDUPTHER

## 2024-03-28 RX ORDER — BUPROPION HYDROCHLORIDE 300 MG/1
300 TABLET ORAL
Qty: 90 TABLET | Refills: 0 | Status: SHIPPED | OUTPATIENT
Start: 2024-03-28 | End: 2024-06-26

## 2024-03-28 RX ORDER — ARIPIPRAZOLE 20 MG/1
20 TABLET ORAL
Qty: 90 TABLET | Refills: 0 | Status: SHIPPED | OUTPATIENT
Start: 2024-03-28 | End: 2024-04-25 | Stop reason: SDUPTHER

## 2024-03-28 RX ORDER — FLUOXETINE HYDROCHLORIDE 40 MG/1
80 CAPSULE ORAL DAILY
Qty: 180 CAPSULE | Refills: 0 | Status: SHIPPED | OUTPATIENT
Start: 2024-03-28 | End: 2024-04-25 | Stop reason: SDUPTHER

## 2024-03-28 RX ORDER — BUSPIRONE HYDROCHLORIDE 15 MG/1
15 TABLET ORAL 2 TIMES DAILY
Qty: 180 TABLET | Refills: 0 | Status: SHIPPED | OUTPATIENT
Start: 2024-03-28 | End: 2024-04-25 | Stop reason: SDUPTHER

## 2024-03-28 NOTE — PROGRESS NOTES
Outpatient Psychiatry      Subjective     Monae Ball, a 39 y.o. female, presents for a medication management /outpatient psychiatry appointment as an established patient     Diagnosis:   MATTI (generalized anxiety disorder) (300.02) (F41.1)  Major depression (296.20) (F32.9)      Patient Active Problem List   Diagnosis    Anxiety associated with depression    Bicuspid aortic valve    History of OCD (obsessive compulsive disorder)    Overweight with body mass index (BMI) of 29 to 29.9 in adult    Seasonal allergies    Acne vulgaris    Aortic valve defect    Aortic valve regurgitation    Benign nevus of skin    Constipation    Current mild episode of major depressive disorder (CMS/HCC)    Moderate episode of recurrent major depressive disorder (CMS/HCC)    Obsessive-compulsive behavior    Sprain of ankle    Sprain of wrist    Abnormal uterine bleeding    Generalized anxiety disorder    Major depression    Overweight (BMI 25.0-29.9)     Treatment Plan/Recommendations: can follow up in 12 weeks   Follow-up plan was discussed with patient.    Review with patient: Treatment plan reviewed with the patient.  Medication risks/benefit reviewed with the patient     HPI:  [facility]   she lives with her wife   she is working from home   abilify has been on regimen for three years   lamotrigine was added to regimen about 4-5 years ago , when she was having issues with anxiety and intrusive thoughts and some suicidal thoughts   bupropion xl has been on regimen for 4-5 years   prozac has been on regimen for 4-5 years , at a consistent schedule    her sleep is good initially , sometimes she has anxiety interferes with her sleep , and she sometimes struggles with walking up due to fatigue and low mood , low motivation   no mood cycling presently  Reconciled and reviewed medication list in the [facility] emr       Current Outpatient Medications:     ARIPiprazole (Abilify) 20 mg tablet, TAKE 1 TABLET BY  MOUTH EVERY MORNING, Disp: 90 tablet, Rfl: 0    buPROPion XL (Wellbutrin XL) 300 mg 24 hr tablet, TAKE 1 TABLET BY MOUTH EVERY MORNING, Disp: 90 tablet, Rfl: 0    busPIRone (Buspar) 15 mg tablet, TAKE 1 TABLET BY MOUTH TWO TIMES A DAY, Disp: 180 tablet, Rfl: 0    FLUoxetine (PROzac) 40 mg capsule, Take 2 capsules (80 mg) by mouth once daily., Disp: 180 capsule, Rfl: 0    ibuprofen 600 mg tablet, Take by mouth., Disp: , Rfl:     ibuprofen 600 mg tablet, Take 1 tablet (600 mg) by mouth., Disp: , Rfl:     lamoTRIgine (LaMICtal) 100 mg tablet, TAKE 1 TABLET BY MOUTH TWO TIMES A DAY, Disp: 180 tablet, Rfl: 0    loratadine (Claritin) 10 mg tablet, Take 2 tablets (20 mg) by mouth once daily., Disp: , Rfl:     loratadine (Claritin) 10 mg tablet, Take 1 tablet (10 mg) by mouth., Disp: , Rfl:     multivitamin tablet, Take 1 tablet by mouth once daily., Disp: , Rfl:     norethindrone-e.estradioL-iron (Microgestin FE 1.5/30) 1.5 mg-30 mcg (21)/75 mg (7) tablet, TAKE 1 TABLET BY MOUTH ONCE DAILY AS DIRECTED, Disp: 84 tablet, Rfl: 3  Medical History:  Past Medical History:   Diagnosis Date    Abnormal uterine bleeding 05/26/2023    Other seasonal allergic rhinitis 05/27/2022    Seasonal allergies    Other specified anxiety disorders     Depression with anxiety    Personal history of other diseases of the nervous system and sense organs     History of migraine     Surgical History:  Past Surgical History:   Procedure Laterality Date    OTHER SURGICAL HISTORY  11/18/2020    [place] tooth extraction     Family History:  No family history on file.  Social History:  Social History     Socioeconomic History    Marital status:      Spouse name: Liz Jaeger    Number of children: Not on file    Years of education: Not on file    Highest education level: Not on file   Occupational History    Occupation:    Tobacco Use    Smoking status: Never    Smokeless tobacco: Never   Vaping Use    Vaping Use: Never used   Substance and  Sexual Activity    Alcohol use: Not Currently     Alcohol/week: 0.0 - 4.0 standard drinks of alcohol    Drug use: Never    Sexual activity: Yes     Partners: Female     Birth control/protection: OCP   Other Topics Concern    Not on file   Social History Narrative    Not on file     Social Determinants of Health     Financial Resource Strain: Not on file   Food Insecurity: Not on file   Transportation Needs: Not on file   Physical Activity: Not on file   Stress: Not on file   Social Connections: Not on file   Intimate Partner Violence: Not on file   Housing Stability: Not on file     Vitals:  There were no vitals filed for this visit.    Yumiko Barbosa, APRN-CNS

## 2024-04-25 ENCOUNTER — TELEMEDICINE (OUTPATIENT)
Dept: BEHAVIORAL HEALTH | Facility: CLINIC | Age: 39
End: 2024-04-25
Payer: COMMERCIAL

## 2024-04-25 DIAGNOSIS — F33.0 MILD EPISODE OF RECURRENT MAJOR DEPRESSIVE DISORDER (CMS-HCC): ICD-10-CM

## 2024-04-25 DIAGNOSIS — F41.1 GAD (GENERALIZED ANXIETY DISORDER): ICD-10-CM

## 2024-04-25 PROCEDURE — 99212 OFFICE O/P EST SF 10 MIN: CPT | Performed by: CLINICAL NURSE SPECIALIST

## 2024-04-25 PROCEDURE — 3008F BODY MASS INDEX DOCD: CPT | Performed by: CLINICAL NURSE SPECIALIST

## 2024-04-25 RX ORDER — ARIPIPRAZOLE 20 MG/1
20 TABLET ORAL
Qty: 90 TABLET | Refills: 0 | Status: SHIPPED | OUTPATIENT
Start: 2024-04-25 | End: 2024-07-24

## 2024-04-25 RX ORDER — FLUOXETINE HYDROCHLORIDE 40 MG/1
80 CAPSULE ORAL DAILY
Qty: 180 CAPSULE | Refills: 0 | Status: SHIPPED | OUTPATIENT
Start: 2024-04-25 | End: 2024-07-24

## 2024-04-25 RX ORDER — BUSPIRONE HYDROCHLORIDE 15 MG/1
15 TABLET ORAL 2 TIMES DAILY
Qty: 180 TABLET | Refills: 0 | Status: SHIPPED | OUTPATIENT
Start: 2024-04-25 | End: 2024-04-25 | Stop reason: SDUPTHER

## 2024-04-25 RX ORDER — LAMOTRIGINE 100 MG/1
100 TABLET ORAL 2 TIMES DAILY
Qty: 180 TABLET | Refills: 0 | Status: SHIPPED | OUTPATIENT
Start: 2024-04-25 | End: 2024-07-24

## 2024-04-25 RX ORDER — BUSPIRONE HYDROCHLORIDE 15 MG/1
15 TABLET ORAL 2 TIMES DAILY
Qty: 180 TABLET | Refills: 0 | Status: SHIPPED | OUTPATIENT
Start: 2024-04-25 | End: 2024-07-24

## 2024-04-25 NOTE — PROGRESS NOTES
Outpatient Psychiatry      Subjective     Monae Ball, a 39 y.o. female presents for a medication management /outpatient psychiatry appointment as an established patient      Diagnosis:   MATTI (generalized anxiety disorder) (300.02) (F41.1)  Major depression (296.20) (F32.9)      Treatment Plan/Recommendations:   Follow-up plan  was discussed with patient.    Review with patient: Treatment plan reviewed with the patient.  Medication risks/benefit reviewed with the patient    HPI:  [facility]   she lives with her wife   she is working from home   abilify has been on regimen for three years   lamotrigine was added to regimen about 4-5 years ago , when she was having issues with anxiety and intrusive thoughts and some suicidal thoughts   bupropion xl has been on regimen for 4-5 years   prozac has been on regimen for 4-5 years , at a consistent schedule    her sleep is ok   no mood cycling presently  Reconciled and reviewed medication list in the [facility] emr     Record Review: brief     Vitals:  There were no vitals filed for this visit.    Yumiko Barbosa, CARMEN-CNS

## 2024-06-06 ENCOUNTER — APPOINTMENT (OUTPATIENT)
Dept: PRIMARY CARE | Facility: CLINIC | Age: 39
End: 2024-06-06
Payer: COMMERCIAL

## 2024-06-17 ENCOUNTER — APPOINTMENT (OUTPATIENT)
Dept: PRIMARY CARE | Facility: CLINIC | Age: 39
End: 2024-06-17
Payer: COMMERCIAL

## 2024-06-17 VITALS
HEART RATE: 76 BPM | BODY MASS INDEX: 28 KG/M2 | DIASTOLIC BLOOD PRESSURE: 72 MMHG | SYSTOLIC BLOOD PRESSURE: 109 MMHG | HEIGHT: 63 IN | WEIGHT: 158 LBS

## 2024-06-17 DIAGNOSIS — Q23.1 BICUSPID AORTIC VALVE (HHS-HCC): ICD-10-CM

## 2024-06-17 DIAGNOSIS — Z13.29 SCREENING FOR THYROID DISORDER: ICD-10-CM

## 2024-06-17 DIAGNOSIS — Z00.00 ROUTINE GENERAL MEDICAL EXAMINATION AT A HEALTH CARE FACILITY: ICD-10-CM

## 2024-06-17 DIAGNOSIS — F41.8 ANXIETY ASSOCIATED WITH DEPRESSION: ICD-10-CM

## 2024-06-17 DIAGNOSIS — J30.2 SEASONAL ALLERGIES: ICD-10-CM

## 2024-06-17 DIAGNOSIS — E66.3 OVERWEIGHT WITH BODY MASS INDEX (BMI) OF 27 TO 27.9 IN ADULT: ICD-10-CM

## 2024-06-17 DIAGNOSIS — Z13.220 SCREENING FOR LIPID DISORDERS: ICD-10-CM

## 2024-06-17 DIAGNOSIS — R46.81 OBSESSIVE-COMPULSIVE BEHAVIOR: ICD-10-CM

## 2024-06-17 PROCEDURE — 1036F TOBACCO NON-USER: CPT | Performed by: INTERNAL MEDICINE

## 2024-06-17 PROCEDURE — 3008F BODY MASS INDEX DOCD: CPT | Performed by: INTERNAL MEDICINE

## 2024-06-17 PROCEDURE — 99395 PREV VISIT EST AGE 18-39: CPT | Performed by: INTERNAL MEDICINE

## 2024-06-17 RX ORDER — LORATADINE 10 MG/1
10 TABLET ORAL DAILY
Status: SHIPPED
Start: 2024-06-17

## 2024-06-17 ASSESSMENT — ENCOUNTER SYMPTOMS
SHORTNESS OF BREATH: 0
ABDOMINAL PAIN: 0
FATIGUE: 0
EYE ITCHING: 0
HEADACHES: 0
PHOTOPHOBIA: 0
BRUISES/BLEEDS EASILY: 0
NERVOUS/ANXIOUS: 1
JOINT SWELLING: 0
BLOOD IN STOOL: 0
DIZZINESS: 0
EYE DISCHARGE: 0
APPETITE CHANGE: 0
SLEEP DISTURBANCE: 0
SEIZURES: 0
CHILLS: 0
WEAKNESS: 0
ANAL BLEEDING: 0
FREQUENCY: 0
AGITATION: 0
DIAPHORESIS: 0
TREMORS: 0
FEVER: 0
STRIDOR: 0
DYSURIA: 0
ACTIVITY CHANGE: 0
WOUND: 0
EYE PAIN: 0
RHINORRHEA: 0
HYPERACTIVE: 0
TROUBLE SWALLOWING: 0
NUMBNESS: 0
DYSPHORIC MOOD: 1
CHOKING: 0
ABDOMINAL DISTENTION: 0
FLANK PAIN: 0
CONSTIPATION: 0
POLYDIPSIA: 0
VOMITING: 0
FACIAL SWELLING: 0
ARTHRALGIAS: 0
WHEEZING: 0
CHEST TIGHTNESS: 0
DIFFICULTY URINATING: 0
SINUS PRESSURE: 0
NECK STIFFNESS: 0
SINUS PAIN: 0
DIARRHEA: 0
EYE REDNESS: 0
FACIAL ASYMMETRY: 0
COLOR CHANGE: 0
ADENOPATHY: 0
SORE THROAT: 0
BACK PAIN: 0
HALLUCINATIONS: 0
NECK PAIN: 0
PALPITATIONS: 0
SPEECH DIFFICULTY: 0
UNEXPECTED WEIGHT CHANGE: 0
HEMATURIA: 0
DECREASED CONCENTRATION: 0
MYALGIAS: 0
COUGH: 0
RECTAL PAIN: 0
POLYPHAGIA: 0
APNEA: 0
CONFUSION: 0
VOICE CHANGE: 0
NAUSEA: 0
LIGHT-HEADEDNESS: 0

## 2024-06-17 ASSESSMENT — PATIENT HEALTH QUESTIONNAIRE - PHQ9
SUM OF ALL RESPONSES TO PHQ9 QUESTIONS 1 AND 2: 2
1. LITTLE INTEREST OR PLEASURE IN DOING THINGS: SEVERAL DAYS
2. FEELING DOWN, DEPRESSED OR HOPELESS: SEVERAL DAYS
10. IF YOU CHECKED OFF ANY PROBLEMS, HOW DIFFICULT HAVE THESE PROBLEMS MADE IT FOR YOU TO DO YOUR WORK, TAKE CARE OF THINGS AT HOME, OR GET ALONG WITH OTHER PEOPLE: SOMEWHAT DIFFICULT

## 2024-06-17 ASSESSMENT — PROMIS GLOBAL HEALTH SCALE
RATE_GENERAL_HEALTH: GOOD
CARRYOUT_SOCIAL_ACTIVITIES: VERY GOOD
RATE_SOCIAL_SATISFACTION: VERY GOOD
CARRYOUT_PHYSICAL_ACTIVITIES: COMPLETELY
RATE_AVERAGE_FATIGUE: MILD
RATE_AVERAGE_PAIN: 0
EMOTIONAL_PROBLEMS: ALWAYS
RATE_PHYSICAL_HEALTH: GOOD
RATE_MENTAL_HEALTH: FAIR
RATE_QUALITY_OF_LIFE: GOOD

## 2024-06-17 NOTE — PATIENT INSTRUCTIONS
Please get fasting blood work when able-no food after midnight-can have water/black coffee/pills in AM  Continue to work on healthy diet-lean protein/fish/veggies and lower in carb/sugar  Exercise with goal of 150 minutes/week of moderate exercise  See GYN annually  Continue meds as directed  See Psychiatrist as directed  Follow up here in 1 year sooner if needed

## 2024-06-17 NOTE — PROGRESS NOTES
Subjective   Patient ID: Monae Ball is a 39 y.o. female who presents for Annual Exam (Physical).    HPI  Pt here for full physical.  She is doing ok overall.  She walks daily 30-45 minutes.      Her mood is ok-she notes fatigue based on how mood is.  She is having OCD issues at present and sees psychiatry every 3-4 months.      She sees  for GYN care.  She has upcoming appt in early fall.  No pelvic or urinary complaints.  Last normal pap was in 2021.      Review of Systems   Constitutional:  Negative for activity change, appetite change, chills, diaphoresis, fatigue, fever and unexpected weight change.   HENT:  Positive for congestion. Negative for dental problem, drooling, ear discharge, ear pain, facial swelling, hearing loss, mouth sores, nosebleeds, postnasal drip, rhinorrhea, sinus pressure, sinus pain, sneezing, sore throat, tinnitus, trouble swallowing and voice change.    Eyes:  Positive for visual disturbance (wears glasses-up to date on exam). Negative for photophobia, pain, discharge, redness and itching.   Respiratory:  Negative for apnea, cough, choking, chest tightness, shortness of breath, wheezing and stridor.    Cardiovascular:  Negative for chest pain, palpitations and leg swelling.   Gastrointestinal:  Negative for abdominal distention, abdominal pain, anal bleeding, blood in stool, constipation, diarrhea, nausea, rectal pain and vomiting.   Endocrine: Negative for cold intolerance, heat intolerance, polydipsia, polyphagia and polyuria.   Genitourinary:  Negative for decreased urine volume, difficulty urinating, dyspareunia, dysuria, enuresis, flank pain, frequency, genital sores, hematuria, menstrual problem, pelvic pain, urgency, vaginal bleeding, vaginal discharge and vaginal pain.   Musculoskeletal:  Negative for arthralgias, back pain, gait problem, joint swelling, myalgias, neck pain and neck stiffness.   Skin:  Negative for color change, pallor, rash and wound.  "  Allergic/Immunologic: Positive for environmental allergies. Negative for food allergies and immunocompromised state.   Neurological:  Negative for dizziness, tremors, seizures, syncope, facial asymmetry, speech difficulty, weakness, light-headedness, numbness and headaches.   Hematological:  Negative for adenopathy. Does not bruise/bleed easily.   Psychiatric/Behavioral:  Positive for dysphoric mood. Negative for agitation, behavioral problems, confusion, decreased concentration, hallucinations, self-injury, sleep disturbance and suicidal ideas. The patient is nervous/anxious. The patient is not hyperactive.        Objective   /72 (BP Location: Left arm, Patient Position: Sitting, BP Cuff Size: Adult)   Pulse 76   Ht 1.606 m (5' 3.24\")   Wt 71.7 kg (158 lb)   BMI 27.78 kg/m²    Physical Exam  Constitutional:       Appearance: Normal appearance.   HENT:      Head: Normocephalic and atraumatic.      Right Ear: Tympanic membrane, ear canal and external ear normal. There is no impacted cerumen.      Left Ear: Tympanic membrane, ear canal and external ear normal. There is no impacted cerumen.      Nose: Nose normal. No congestion or rhinorrhea.      Mouth/Throat:      Mouth: Mucous membranes are moist.      Pharynx: Oropharynx is clear. No oropharyngeal exudate or posterior oropharyngeal erythema.   Eyes:      Extraocular Movements: Extraocular movements intact.      Conjunctiva/sclera: Conjunctivae normal.      Pupils: Pupils are equal, round, and reactive to light.   Neck:      Vascular: No carotid bruit.   Cardiovascular:      Rate and Rhythm: Normal rate and regular rhythm.      Pulses: Normal pulses.      Heart sounds: Normal heart sounds. No murmur heard.  Pulmonary:      Effort: Pulmonary effort is normal. No respiratory distress.      Breath sounds: Normal breath sounds. No wheezing, rhonchi or rales.   Abdominal:      General: Abdomen is flat. Bowel sounds are normal. There is no distension.      " Palpations: Abdomen is soft.      Tenderness: There is no abdominal tenderness.      Hernia: No hernia is present.   Musculoskeletal:         General: No swelling or tenderness. Normal range of motion.      Cervical back: Normal range of motion and neck supple.      Right lower leg: No edema.      Left lower leg: No edema.   Lymphadenopathy:      Cervical: No cervical adenopathy.   Skin:     General: Skin is warm and dry.      Findings: No lesion or rash.   Neurological:      General: No focal deficit present.      Mental Status: She is alert and oriented to person, place, and time.      Cranial Nerves: No cranial nerve deficit.      Sensory: No sensory deficit.      Motor: No weakness.   Psychiatric:         Mood and Affect: Mood normal.         Behavior: Behavior normal.         Thought Content: Thought content normal.         Judgment: Judgment normal.         Assessment/Plan   Problem List Items Addressed This Visit       Anxiety associated with depression     Sees psych  Mood is stable  Energy levels are based on how mood is          Bicuspid aortic valve ([facility])     No current issues          Overweight with body mass index (BMI) of 27 to 27.9 in adult     She is trying to eat more vegetarian like  She will continue to work on this and encouraged exercise as well          Seasonal allergies     Uses antihistamine as needed          Obsessive-compulsive behavior     Other Visit Diagnoses       Routine general medical examination at a health care facility        Relevant Orders    Comprehensive Metabolic Panel    CBC    Follow Up In Primary Care - Health Maintenance    Screening for lipid disorders        Relevant Orders    Lipid Panel    Screening for thyroid disorder        Relevant Orders    TSH

## 2024-06-17 NOTE — ASSESSMENT & PLAN NOTE
She is trying to eat more vegetarian like  She will continue to work on this and encouraged exercise as well

## 2024-06-18 PROCEDURE — RXMED WILLOW AMBULATORY MEDICATION CHARGE

## 2024-06-21 ENCOUNTER — PHARMACY VISIT (OUTPATIENT)
Dept: PHARMACY | Facility: CLINIC | Age: 39
End: 2024-06-21
Payer: COMMERCIAL

## 2024-07-25 ENCOUNTER — APPOINTMENT (OUTPATIENT)
Dept: BEHAVIORAL HEALTH | Facility: CLINIC | Age: 39
End: 2024-07-25
Payer: COMMERCIAL

## 2024-07-25 DIAGNOSIS — F32.9 MAJOR DEPRESSIVE DISORDER, REMISSION STATUS UNSPECIFIED, UNSPECIFIED WHETHER RECURRENT: ICD-10-CM

## 2024-07-25 DIAGNOSIS — F41.1 GAD (GENERALIZED ANXIETY DISORDER): ICD-10-CM

## 2024-07-25 DIAGNOSIS — F33.0 MILD EPISODE OF RECURRENT MAJOR DEPRESSIVE DISORDER (CMS-HCC): ICD-10-CM

## 2024-07-25 PROCEDURE — 99212 OFFICE O/P EST SF 10 MIN: CPT | Performed by: CLINICAL NURSE SPECIALIST

## 2024-07-25 PROCEDURE — RXMED WILLOW AMBULATORY MEDICATION CHARGE

## 2024-07-25 RX ORDER — LAMOTRIGINE 100 MG/1
100 TABLET ORAL 2 TIMES DAILY
Qty: 180 TABLET | Refills: 0 | Status: SHIPPED | OUTPATIENT
Start: 2024-07-25 | End: 2024-10-23

## 2024-07-25 RX ORDER — ARIPIPRAZOLE 20 MG/1
20 TABLET ORAL
Qty: 90 TABLET | Refills: 0 | Status: SHIPPED | OUTPATIENT
Start: 2024-07-25 | End: 2024-10-23

## 2024-07-25 RX ORDER — FLUOXETINE HYDROCHLORIDE 40 MG/1
80 CAPSULE ORAL DAILY
Qty: 180 CAPSULE | Refills: 0 | Status: SHIPPED | OUTPATIENT
Start: 2024-07-25 | End: 2024-10-23

## 2024-07-25 RX ORDER — BUSPIRONE HYDROCHLORIDE 15 MG/1
15 TABLET ORAL 2 TIMES DAILY
Qty: 180 TABLET | Refills: 0 | Status: SHIPPED | OUTPATIENT
Start: 2024-07-25 | End: 2024-10-24

## 2024-07-25 NOTE — PROGRESS NOTES
Outpatient Psychiatry      Subjective     Monae Ball, a 39 y.o. female, presents for a medication management /outpatient psychiatry appointment as an established patient for an audio visual appointment , she verbally consented to the appointment , and was at work for the appointment      Diagnosis:   MATTI (generalized anxiety disorder) (300.02) (F41.1) mild   Major depression (296.20) (F32.9) mild       Patient Active Problem List   Diagnosis    Anxiety associated with depression    Bicuspid aortic valve ([facility]-HCC)    Overweight with body mass index (BMI) of 27 to 27.9 in adult    Seasonal allergies    Acne vulgaris    Aortic valve defect    Aortic valve regurgitation    Benign nevus of skin    Constipation    Current mild episode of major depressive disorder (CMS-HCC)    Moderate episode of recurrent major depressive disorder (Multi)    Obsessive-compulsive behavior    Sprain of ankle    Sprain of wrist    Abnormal uterine bleeding    Generalized anxiety disorder    Major depression    Overweight (BMI 25.0-29.9)     Treatment Plan/Recommendations:   Follow-up plan  was discussed with patient.can follow up in 10-12 weeks , can continue self care and wellness efforts and maintain routine health screenings , can call  for treatment and scheduling concerns   Psychotropic medications :  Continue abilify 20 mg , daily fore depression   Continue Bupropion xl 300 mg , daily each morning for depression   Continue Buspirone 15 mg twice a day for anxiety   Continue Fluoxetine 40 mg 2 capsules daily for depression and anxiety  Continue lamotrigine 100 mg , twice a day for depression     Review with patient: Treatment plan reviewed with the patient.  Medication risks/benefit reviewed with the patient    Hpi :  she says she is in therapy at [facility]   she lives with her wife   she is working from home   abilify has been on regimen for three years   lamotrigine was added to regimen about 4-5 years ago , when she  was having issues with anxiety and intrusive thoughts and some suicidal thoughts   bupropion xl has been on regimen for 4-5 years   prozac has been on regimen for 4-5 years , at a consistent schedule    her sleep is ok   no mood cycling presently  Reconciled and reviewed medication list in the [facility] emr   She listens to music for relaxation   She does yoga twice a week  She gets nervous in the mornings before she goes to work and once she gets tpo work and starts the day her anxiety usually lessens     [facility] and [facility] as noted above       Current Outpatient Medications:     ARIPiprazole (Abilify) 20 mg tablet, TAKE 1 TABLET BY MOUTH EVERY MORNING, Disp: 90 tablet, Rfl: 0    buPROPion XL (Wellbutrin XL) 300 mg 24 hr tablet, TAKE 1 TABLET BY MOUTH EVERY MORNING, Disp: 90 tablet, Rfl: 0    busPIRone (Buspar) 15 mg tablet, TAKE 1 TABLET BY MOUTH TWO TIMES A DAY, Disp: 180 tablet, Rfl: 0    FLUoxetine (PROzac) 40 mg capsule, Take 2 capsules (80 mg) by mouth once daily., Disp: 180 capsule, Rfl: 0    lamoTRIgine (LaMICtal) 100 mg tablet, TAKE 1 TABLET BY MOUTH TWO TIMES A DAY, Disp: 180 tablet, Rfl: 0    loratadine (Claritin) 10 mg tablet, Take 1 tablet (10 mg) by mouth once daily., Disp: , Rfl:     multivitamin tablet, Take 1 tablet by mouth once daily., Disp: , Rfl:     norethindrone-e.estradioL-iron (Microgestin FE 1.5/30) 1.5 mg-30 mcg (21)/75 mg (7) tablet, TAKE 1 TABLET BY MOUTH ONCE DAILY AS DIRECTED, Disp: 84 tablet, Rfl: 3  Medical History:  Past Medical History:   Diagnosis Date    Abnormal uterine bleeding 05/26/2023    Other seasonal allergic rhinitis 05/27/2022    Seasonal allergies    Other specified anxiety disorders     Depression with anxiety    Personal history of other diseases of the nervous system and sense organs     History of migraine     Surgical History:  Past Surgical History:   Procedure Laterality Date    WISDOM TOOTH EXTRACTION       Family History:  Family History   Problem Relation  Name Age of Onset    Other (prediabetes) Mother      Anxiety disorder Father      Cataracts Father       Social History:  Social History     Socioeconomic History    Marital status:      Spouse name: Liz Jaeger    Number of children: 0    Years of education: Not on file    Highest education level: Not on file   Occupational History    Occupation:    Tobacco Use    Smoking status: Never    Smokeless tobacco: Never   Vaping Use    Vaping status: Never Used   Substance and Sexual Activity    Alcohol use: Yes     Alcohol/week: 0.0 - 4.0 standard drinks of alcohol    Drug use: Never    Sexual activity: Yes     Partners: Female     Birth control/protection: OCP   Other Topics Concern    Not on file   Social History Narrative    Not on file     Social Determinants of Health     Financial Resource Strain: Not on file (4/15/2021)   Food Insecurity: No Food Insecurity (4/15/2021)    Received from [employer]    Hunger Vital Sign     Worried About Running Out of Food in the Last Year: Never true     Ran Out of Food in the Last Year: Never true   Transportation Needs: No Transportation Needs (4/15/2021)    Received from [employer]    PRAPARE - Transportation     Lack of Transportation (Medical): No     Lack of Transportation (Non-Medical): No   Physical Activity: Not on file (4/15/2021)   Stress: Not on file (4/15/2021)   Social Connections: Unknown (4/15/2021)    Received from [employer]    Social Connection and Isolation Panel [NHANES]     Frequency of Communication with Friends and Family: More than three times a week     Frequency of Social Gatherings with Friends and Family: Once a week     Attends [employer] Services: Never     Active Member of Clubs or Organizations: Yes     Attends Club or Organization Meetings: Never     Marital Status: Not on file   Intimate Partner Violence: Not At Risk (4/15/2021)    Received from [employer]    Humiliation,  Afraid, Rape, and Kick questionnaire     Fear of Current or Ex-Partner: No     Emotionally Abused: No     Physically Abused: No     Sexually Abused: No   Housing Stability: Unknown (4/15/2021)    [facility]    Housing Stability Vital Sign     Unable to Pay for Housing in the Last Year: No     Number of Places Lived in the Last Year: Not on file     Unstable Housing in the Last Year: No     Record Review: brief     Vitals:  There were no vitals filed for this visit.    Yumiko Barbosa, APRN-CNS

## 2024-07-26 ENCOUNTER — PHARMACY VISIT (OUTPATIENT)
Dept: PHARMACY | Facility: CLINIC | Age: 39
End: 2024-07-26
Payer: COMMERCIAL

## 2024-09-19 DIAGNOSIS — F33.0 MILD EPISODE OF RECURRENT MAJOR DEPRESSIVE DISORDER (CMS-HCC): ICD-10-CM

## 2024-09-19 DIAGNOSIS — F41.1 GAD (GENERALIZED ANXIETY DISORDER): ICD-10-CM

## 2024-09-19 PROCEDURE — RXMED WILLOW AMBULATORY MEDICATION CHARGE

## 2024-09-20 PROCEDURE — RXMED WILLOW AMBULATORY MEDICATION CHARGE

## 2024-09-20 RX ORDER — BUSPIRONE HYDROCHLORIDE 15 MG/1
15 TABLET ORAL 2 TIMES DAILY
Qty: 60 TABLET | Refills: 0 | Status: SHIPPED | OUTPATIENT
Start: 2024-09-20 | End: 2024-10-20

## 2024-09-20 RX ORDER — BUPROPION HYDROCHLORIDE 300 MG/1
300 TABLET ORAL
Qty: 30 TABLET | Refills: 0 | Status: SHIPPED | OUTPATIENT
Start: 2024-09-20 | End: 2024-10-20

## 2024-09-23 ENCOUNTER — PHARMACY VISIT (OUTPATIENT)
Dept: PHARMACY | Facility: CLINIC | Age: 39
End: 2024-09-23
Payer: COMMERCIAL

## 2024-09-24 ENCOUNTER — APPOINTMENT (OUTPATIENT)
Dept: BEHAVIORAL HEALTH | Facility: CLINIC | Age: 39
End: 2024-09-24
Payer: COMMERCIAL

## 2024-09-24 DIAGNOSIS — F41.1 GAD (GENERALIZED ANXIETY DISORDER): ICD-10-CM

## 2024-09-24 DIAGNOSIS — F33.0 MILD EPISODE OF RECURRENT MAJOR DEPRESSIVE DISORDER (CMS-HCC): ICD-10-CM

## 2024-09-24 PROCEDURE — RXMED WILLOW AMBULATORY MEDICATION CHARGE

## 2024-09-24 PROCEDURE — 99212 OFFICE O/P EST SF 10 MIN: CPT | Performed by: CLINICAL NURSE SPECIALIST

## 2024-09-24 RX ORDER — BUSPIRONE HYDROCHLORIDE 15 MG/1
15 TABLET ORAL 2 TIMES DAILY
Qty: 60 TABLET | Refills: 0 | Status: SHIPPED | OUTPATIENT
Start: 2024-09-24 | End: 2024-10-24

## 2024-09-24 RX ORDER — LAMOTRIGINE 100 MG/1
100 TABLET ORAL 2 TIMES DAILY
Qty: 180 TABLET | Refills: 0 | Status: SHIPPED | OUTPATIENT
Start: 2024-09-24 | End: 2024-12-23

## 2024-09-24 RX ORDER — BUPROPION HYDROCHLORIDE 300 MG/1
300 TABLET ORAL
Qty: 30 TABLET | Refills: 0 | Status: SHIPPED | OUTPATIENT
Start: 2024-09-24 | End: 2024-10-24

## 2024-09-24 RX ORDER — HYDROXYZINE HYDROCHLORIDE 10 MG/1
10 TABLET, FILM COATED ORAL 2 TIMES DAILY PRN
Qty: 20 TABLET | Refills: 0 | Status: SHIPPED | OUTPATIENT
Start: 2024-09-24 | End: 2024-10-07

## 2024-09-24 RX ORDER — FLUOXETINE HYDROCHLORIDE 40 MG/1
80 CAPSULE ORAL DAILY
Qty: 180 CAPSULE | Refills: 0 | Status: SHIPPED | OUTPATIENT
Start: 2024-09-24 | End: 2024-12-23

## 2024-09-24 RX ORDER — ARIPIPRAZOLE 20 MG/1
20 TABLET ORAL
Qty: 90 TABLET | Refills: 0 | Status: SHIPPED | OUTPATIENT
Start: 2024-09-24 | End: 2024-12-23

## 2024-09-24 NOTE — PROGRESS NOTES
Outpatient Psychiatry      Subjective   Monae Ball, a 39 y.o. female presents for a medication management /outpatient psychiatry routine follow up appointment as an established patient for an audio visual appointment   Virtual or Telephone Consent    An interactive audio and video telecommunication system which permits real time communications between the patient (at the originating site) and provider (at the distant site) was utilized to provide this telehealth service.   Verbal consent was requested and obtained from Monae Ball on this date, 09/29/24 for a telehealth visit. She was at work for the appointment      Diagnosis:   MATTI (generalized anxiety disorder)  (F41.1) mild   mild episode of major depressive recurrent disorder F33.0        Problem List       Patient Active Problem List   Diagnosis    Anxiety associated with depression    Bicuspid aortic valve (HHS-HCC)    Overweight with body mass index (BMI) of 27 to 27.9 in adult    Seasonal allergies    Acne vulgaris    Aortic valve defect    Aortic valve regurgitation    Benign nevus of skin    Constipation    Current mild episode of major depressive disorder (CMS-HCC)    Moderate episode of recurrent major depressive disorder (Multi)    Obsessive-compulsive behavior    Sprain of ankle    Sprain of wrist    Abnormal uterine bleeding    Generalized anxiety disorder    Major depression    Overweight (BMI 25.0-29.9)         Treatment Plan/Recommendations:   Follow-up plan  was discussed with patient.can follow up in 10-12 weeks , can continue self care and wellness efforts and maintain routine health screenings , can call  for treatment and scheduling concerns   Psychotropic medications :  Continue abilify 20 mg , daily for depression   Continue Bupropion xl 300 mg , daily each morning for depression   Continue Buspirone 15 mg twice a day for anxiety   Continue Fluoxetine 40 mg 2 capsules daily for depression and anxiety  Continue  lamotrigine 100 mg , twice a day for depression      Review with patient: Treatment plan reviewed with the patient.  Medication risks/benefit reviewed with the patient     Hpi :  she says she is in therapy at [facility]   she lives with her wife   she is working from home   abilify has been on regimen for three years   lamotrigine was added to regimen about 4-5 years ago , when she was having issues with anxiety and intrusive thoughts and some suicidal thoughts   bupropion xl has been on regimen for 4-5 years   prozac has been on regimen for 4-5 years , at a consistent schedule    her sleep is ok   no mood cycling presently  Reconciled and reviewed medication list in the [facility] emr   She listens to music for relaxation   She does yoga twice a week  She gets nervous in the mornings before she goes to work and once she gets to work and starts the day her anxiety usually lessens   She deals with right knee pain      [facility] as noted above           Current Outpatient Medications:     ARIPiprazole (Abilify) 20 mg tablet, TAKE 1 TABLET BY MOUTH EVERY MORNING, Disp: 90 tablet, Rfl: 0    buPROPion XL (Wellbutrin XL) 300 mg 24 hr tablet, TAKE 1 TABLET BY MOUTH EVERY MORNING, Disp: 30 tablet, Rfl: 0    busPIRone (Buspar) 15 mg tablet, TAKE 1 TABLET BY MOUTH TWO TIMES A DAY, Disp: 60 tablet, Rfl: 0    FLUoxetine (PROzac) 40 mg capsule, Take 2 capsules (80 mg) by mouth once daily., Disp: 180 capsule, Rfl: 0    lamoTRIgine (LaMICtal) 100 mg tablet, TAKE 1 TABLET BY MOUTH TWO TIMES A DAY, Disp: 180 tablet, Rfl: 0    loratadine (Claritin) 10 mg tablet, Take 1 tablet (10 mg) by mouth once daily., Disp: , Rfl:     multivitamin tablet, Take 1 tablet by mouth once daily., Disp: , Rfl:     norethindrone-e.estradioL-iron (Microgestin FE 1.5/30) 1.5 mg-30 mcg (21)/75 mg (7) tablet, TAKE 1 TABLET BY MOUTH ONCE DAILY AS DIRECTED, Disp: 84 tablet, Rfl: 3  Medical History:  Past Medical History:   Diagnosis Date    Abnormal  uterine bleeding 05/26/2023    Other seasonal allergic rhinitis 05/27/2022    Seasonal allergies    Other specified anxiety disorders     Depression with anxiety    Personal history of other diseases of the nervous system and sense organs     History of migraine     Surgical History:  Past Surgical History:   Procedure Laterality Date    WISDOM TOOTH EXTRACTION       Family History:  Family History   Problem Relation Name Age of Onset    Other (prediabetes) Mother      Anxiety disorder Father      Cataracts Father       Social History:  Social History     Socioeconomic History    Marital status:      Spouse name: Liz Jaeger    Number of children: 0    Years of education: Not on file    Highest education level: Not on file   Occupational History    Occupation:    Tobacco Use    Smoking status: Never    Smokeless tobacco: Never   Vaping Use    Vaping status: Never Used   Substance and Sexual Activity    Alcohol use: Yes     Alcohol/week: 0.0 - 4.0 standard drinks of alcohol    Drug use: Never    Sexual activity: Yes     Partners: Female     Birth control/protection: OCP   Other Topics Concern    Not on file   Social History Narrative    Not on file     Social Determinants of Health     Financial Resource Strain: Not on file (4/15/2021)   Food Insecurity: No Food Insecurity (4/15/2021)    Received from [employer]    Hunger Vital Sign     Worried About Running Out of Food in the Last Year: Never true     Ran Out of Food in the Last Year: Never true   Transportation Needs: No Transportation Needs (4/15/2021)    Received from [employer]    PRAPARE - Transportation     Lack of Transportation (Medical): No     Lack of Transportation (Non-Medical): No   Physical Activity: Not on file (4/15/2021)   Stress: Not on file (4/15/2021)   Social Connections: Unknown (4/15/2021)    Received from [employer]    Social Connection and Isolation Panel [NHANES]     Frequency of  Communication with Friends and Family: More than three times a week     Frequency of Social Gatherings with Friends and Family: Once a week     Attends Mu-ism Services: Never     Active Member of Clubs or Organizations: Yes     Attends Club or Organization Meetings: Never     Marital Status: Not on file   Intimate Partner Violence: Not At Risk (4/15/2021)    Received from [employer] [employer]    Humiliation, Afraid, Rape, and Kick questionnaire     Fear of Current or Ex-Partner: No     Emotionally Abused: No     Physically Abused: No     Sexually Abused: No   Housing Stability: Unknown (4/15/2021)    Received from [employer], [employer]    Housing Stability Vital Sign     Unable to Pay for Housing in the Last Year: No     Number of Places Lived in the Last Year: Not on file     Unstable Housing in the Last Year: No     Vitals:  There were no vitals filed for this visit.    Yumiko Barbosa, APRN-CNS

## 2024-09-27 ENCOUNTER — PHARMACY VISIT (OUTPATIENT)
Dept: PHARMACY | Facility: CLINIC | Age: 39
End: 2024-09-27
Payer: COMMERCIAL

## 2024-10-09 ENCOUNTER — APPOINTMENT (OUTPATIENT)
Dept: OBSTETRICS AND GYNECOLOGY | Facility: CLINIC | Age: 39
End: 2024-10-09
Payer: COMMERCIAL

## 2024-11-01 PROCEDURE — RXMED WILLOW AMBULATORY MEDICATION CHARGE

## 2024-11-05 ENCOUNTER — PHARMACY VISIT (OUTPATIENT)
Dept: PHARMACY | Facility: CLINIC | Age: 39
End: 2024-11-05
Payer: COMMERCIAL

## 2024-12-02 DIAGNOSIS — F33.0 MILD EPISODE OF RECURRENT MAJOR DEPRESSIVE DISORDER (CMS-HCC): ICD-10-CM

## 2024-12-02 PROCEDURE — RXMED WILLOW AMBULATORY MEDICATION CHARGE

## 2024-12-02 RX ORDER — BUPROPION HYDROCHLORIDE 300 MG/1
300 TABLET ORAL
Qty: 30 TABLET | Refills: 0 | Status: SHIPPED | OUTPATIENT
Start: 2024-12-02 | End: 2025-01-04

## 2024-12-05 ENCOUNTER — PHARMACY VISIT (OUTPATIENT)
Dept: PHARMACY | Facility: CLINIC | Age: 39
End: 2024-12-05
Payer: COMMERCIAL

## 2024-12-16 DIAGNOSIS — N93.9 ABNORMAL UTERINE BLEEDING: ICD-10-CM

## 2024-12-16 DIAGNOSIS — F41.1 GAD (GENERALIZED ANXIETY DISORDER): ICD-10-CM

## 2024-12-16 RX ORDER — BUSPIRONE HYDROCHLORIDE 15 MG/1
15 TABLET ORAL 2 TIMES DAILY
Qty: 60 TABLET | Refills: 0 | Status: SHIPPED | OUTPATIENT
Start: 2024-12-16 | End: 2024-12-19 | Stop reason: SDUPTHER

## 2024-12-17 PROCEDURE — RXMED WILLOW AMBULATORY MEDICATION CHARGE

## 2024-12-17 RX ORDER — NORETHINDRONE ACETATE AND ETHINYL ESTRADIOL 1.5-30(21)
1 KIT ORAL DAILY
Qty: 84 TABLET | Refills: 3 | Status: SHIPPED | OUTPATIENT
Start: 2024-12-17 | End: 2025-12-17

## 2024-12-18 ENCOUNTER — APPOINTMENT (OUTPATIENT)
Dept: BEHAVIORAL HEALTH | Facility: CLINIC | Age: 39
End: 2024-12-18
Payer: COMMERCIAL

## 2024-12-19 ENCOUNTER — TELEPHONE (OUTPATIENT)
Dept: OTHER | Age: 39
End: 2024-12-19

## 2024-12-19 ENCOUNTER — PHARMACY VISIT (OUTPATIENT)
Dept: PHARMACY | Facility: CLINIC | Age: 39
End: 2024-12-19
Payer: COMMERCIAL

## 2024-12-19 ENCOUNTER — DOCUMENTATION (OUTPATIENT)
Dept: BEHAVIORAL HEALTH | Facility: CLINIC | Age: 39
End: 2024-12-19

## 2024-12-19 ENCOUNTER — APPOINTMENT (OUTPATIENT)
Dept: BEHAVIORAL HEALTH | Facility: CLINIC | Age: 39
End: 2024-12-19
Payer: COMMERCIAL

## 2024-12-19 DIAGNOSIS — F41.1 GAD (GENERALIZED ANXIETY DISORDER): ICD-10-CM

## 2024-12-19 DIAGNOSIS — F41.1 GENERALIZED ANXIETY DISORDER: ICD-10-CM

## 2024-12-19 DIAGNOSIS — F33.0 MILD EPISODE OF RECURRENT MAJOR DEPRESSIVE DISORDER (CMS-HCC): ICD-10-CM

## 2024-12-19 PROCEDURE — RXMED WILLOW AMBULATORY MEDICATION CHARGE

## 2024-12-19 RX ORDER — BUPROPION HYDROCHLORIDE 300 MG/1
300 TABLET ORAL
Qty: 90 TABLET | Refills: 0 | Status: SHIPPED | OUTPATIENT
Start: 2024-12-19 | End: 2025-03-19

## 2024-12-19 RX ORDER — ARIPIPRAZOLE 20 MG/1
20 TABLET ORAL
Qty: 90 TABLET | Refills: 0 | Status: SHIPPED | OUTPATIENT
Start: 2024-12-19 | End: 2025-03-20

## 2024-12-19 RX ORDER — BUSPIRONE HYDROCHLORIDE 15 MG/1
15 TABLET ORAL 2 TIMES DAILY
Qty: 180 TABLET | Refills: 0 | Status: SHIPPED | OUTPATIENT
Start: 2024-12-19 | End: 2025-03-20

## 2024-12-19 RX ORDER — FLUOXETINE HYDROCHLORIDE 40 MG/1
80 CAPSULE ORAL DAILY
Qty: 180 CAPSULE | Refills: 0 | Status: SHIPPED | OUTPATIENT
Start: 2024-12-19 | End: 2025-03-20

## 2024-12-19 RX ORDER — LAMOTRIGINE 100 MG/1
100 TABLET ORAL 2 TIMES DAILY
Qty: 180 TABLET | Refills: 0 | Status: SHIPPED | OUTPATIENT
Start: 2024-12-19 | End: 2025-03-20

## 2024-12-19 NOTE — TELEPHONE ENCOUNTER
Caller: pt - had appointment today that was cancelled by provider, rescheduled for 1.28 but will be out of medications before then    Buspirone 15 mgs  Fluoxetine 40 mgs  Lamotrigine 100 mgs    Pharmacy: [facility]

## 2024-12-19 NOTE — PROGRESS NOTES
Nonbillable note : sent scripts requested to pharmacy [facility]    [facility] DH (Discharge Huddle; Vulnerable Patient)

## 2024-12-20 ENCOUNTER — PHARMACY VISIT (OUTPATIENT)
Dept: PHARMACY | Facility: CLINIC | Age: 39
End: 2024-12-20
Payer: COMMERCIAL

## 2024-12-31 PROCEDURE — RXMED WILLOW AMBULATORY MEDICATION CHARGE

## 2025-01-02 ENCOUNTER — PHARMACY VISIT (OUTPATIENT)
Dept: PHARMACY | Facility: CLINIC | Age: 40
End: 2025-01-02
Payer: COMMERCIAL

## 2025-01-15 ENCOUNTER — APPOINTMENT (OUTPATIENT)
Dept: OBSTETRICS AND GYNECOLOGY | Facility: CLINIC | Age: 40
End: 2025-01-15
Payer: COMMERCIAL

## 2025-01-28 ENCOUNTER — APPOINTMENT (OUTPATIENT)
Dept: BEHAVIORAL HEALTH | Facility: CLINIC | Age: 40
End: 2025-01-28
Payer: COMMERCIAL

## 2025-01-28 DIAGNOSIS — F33.0 MILD EPISODE OF RECURRENT MAJOR DEPRESSIVE DISORDER (CMS-HCC): ICD-10-CM

## 2025-01-28 DIAGNOSIS — F41.1 GAD (GENERALIZED ANXIETY DISORDER): ICD-10-CM

## 2025-01-28 PROCEDURE — 99212 OFFICE O/P EST SF 10 MIN: CPT | Performed by: CLINICAL NURSE SPECIALIST

## 2025-01-28 RX ORDER — LAMOTRIGINE 100 MG/1
100 TABLET ORAL 2 TIMES DAILY
Qty: 180 TABLET | Refills: 0 | Status: SHIPPED | OUTPATIENT
Start: 2025-01-28 | End: 2025-04-28

## 2025-01-28 RX ORDER — BUSPIRONE HYDROCHLORIDE 15 MG/1
15 TABLET ORAL 2 TIMES DAILY
Qty: 180 TABLET | Refills: 0 | Status: SHIPPED | OUTPATIENT
Start: 2025-01-28 | End: 2025-04-28

## 2025-01-28 RX ORDER — BUPROPION HYDROCHLORIDE 300 MG/1
300 TABLET ORAL
Qty: 90 TABLET | Refills: 0 | Status: SHIPPED | OUTPATIENT
Start: 2025-01-28 | End: 2025-04-28

## 2025-01-28 RX ORDER — FLUOXETINE HYDROCHLORIDE 40 MG/1
80 CAPSULE ORAL DAILY
Qty: 180 CAPSULE | Refills: 0 | Status: SHIPPED | OUTPATIENT
Start: 2025-01-28 | End: 2025-04-28

## 2025-01-28 RX ORDER — ARIPIPRAZOLE 20 MG/1
20 TABLET ORAL
Qty: 90 TABLET | Refills: 0 | Status: SHIPPED | OUTPATIENT
Start: 2025-01-28 | End: 2025-04-28

## 2025-01-28 NOTE — PROGRESS NOTES
Outpatient Psychiatry      Subjective   Monae Ball, a 39 y.o. female,  presents for a medication management /outpatient psychiatry routine follow up appointment as an established patient for an audio and video virtual appointment     Virtual or Telephone Consent     An interactive audio and video telecommunication system which permits real time communications between the patient (at the originating site) and provider (at the distant site) was utilized to provide this telehealth service.   Verbal consent was requested and obtained from Monae Ball on this date, 01/28/25 for a telehealth visit. She was at work for the appointment      Diagnosis:   MATTI (generalized anxiety disorder)  (F41.1) mild   mild episode of major depressive recurrent disorder F33.0        Problem List         Patient Active Problem List   Diagnosis    Anxiety associated with depression    Bicuspid aortic valve (HHS-HCC)    Overweight with body mass index (BMI) of 27 to 27.9 in adult    Seasonal allergies    Acne vulgaris    Aortic valve defect    Aortic valve regurgitation    Benign nevus of skin    Constipation    Current mild episode of major depressive disorder (CMS-HCC)    Moderate episode of recurrent major depressive disorder (Multi)    Obsessive-compulsive behavior    Sprain of ankle    Sprain of wrist    Abnormal uterine bleeding    Generalized anxiety disorder    Major depression    Overweight (BMI 25.0-29.9)         Treatment Plan/Recommendations:   Follow-up plan  was discussed with patient.can follow up in 10-12 weeks , can continue self care and wellness efforts and maintain routine health screenings , can call  for treatment and scheduling concerns   Psychotropic medications :  Continue abilify 20 mg , daily for depression   Continue Bupropion xl 300 mg , daily each morning for depression   Continue Buspirone 15 mg twice a day for anxiety   Continue Fluoxetine 40 mg 2 capsules daily for depression and  anxiety  Continue lamotrigine 100 mg , twice a day for depression      Review with patient: Treatment plan reviewed with the patient.  Medication risks/benefit reviewed with the patient     Hpi :  she says she is in therapy at [facility] , this is helpful , she is doing a mindfulness based therapy   she lives with her wife    phil has been on regimen for three years   lamotrigine was added to regimen about 4-5 years ago , when she was having issues with anxiety and intrusive thoughts and some suicidal thoughts   bupropion xl has been on regimen for 4-5 years   prozac has been on regimen for 4-5 years , at a consistent schedule    her sleep is ok   no mood cycling presently  Reconciled and reviewed medication list in the [facility] emr   She listens to music for relaxation   She does yoga , has been busy has not done this as much   has supportive people in her life   Prayer is an important sense of coping for her   She has some anxiety before she goes to work and once she gets to work and starts the day her anxiety lessens   She deals with right knee pain, her knee is feeling better now , she says that may have been a strain a few months ago   No new otc meds or prescribed meds   No new health concerns   Most recent labs reviewed , liver and kidney function within normal limits   Hg a 1 c was checked and was within normal limits   Lipid panel , the only thing that was out of normal range was 102 for ldl , slightly elevated , no other lipid panel concerns       [facility] as noted above                  Patient Active Problem List   Diagnosis    Anxiety associated with depression    Bicuspid aortic valve    Overweight with body mass index (BMI) of 27 to 27.9 in adult    Seasonal allergies    Acne vulgaris    Aortic valve defect    Aortic valve regurgitation    Benign nevus of skin    Constipation    Current mild episode of major depressive disorder (CMS-HCC)    Moderate episode of recurrent major  depressive disorder    Obsessive-compulsive behavior    Sprain of ankle    Sprain of wrist    Abnormal uterine bleeding    Generalized anxiety disorder    Major depression    Overweight (BMI 25.0-29.9)     Current Outpatient Medications:     ARIPiprazole (Abilify) 20 mg tablet, TAKE 1 TABLET BY MOUTH EVERY MORNING, Disp: 90 tablet, Rfl: 0    buPROPion XL (Wellbutrin XL) 300 mg 24 hr tablet, TAKE 1 TABLET BY MOUTH EVERY MORNING, Disp: 90 tablet, Rfl: 0    busPIRone (Buspar) 15 mg tablet, TAKE 1 TABLET BY MOUTH TWO TIMES A DAY, Disp: 180 tablet, Rfl: 0    FLUoxetine (PROzac) 40 mg capsule, Take 2 capsules (80 mg) by mouth once daily., Disp: 180 capsule, Rfl: 0    hydrOXYzine HCL (Atarax) 10 mg tablet, Take 1 tablet (10 mg) by mouth 2 times a day as needed for anxiety for up to 10 days., Disp: 20 tablet, Rfl: 0    lamoTRIgine (LaMICtal) 100 mg tablet, TAKE 1 TABLET BY MOUTH TWO TIMES A DAY, Disp: 180 tablet, Rfl: 0    loratadine (Claritin) 10 mg tablet, Take 1 tablet (10 mg) by mouth once daily., Disp: , Rfl:     multivitamin tablet, Take 1 tablet by mouth once daily., Disp: , Rfl:     norethindrone-e.estradioL-iron (Emily Fe 1.5/30, 28,) 1.5 mg-30 mcg (21)/75 mg (7) tablet, TAKE 1 TABLET BY MOUTH ONCE DAILY AS DIRECTED, Disp: 84 tablet, Rfl: 3  Medical History:  Past Medical History:   Diagnosis Date    Abnormal uterine bleeding 05/26/2023    Other seasonal allergic rhinitis 05/27/2022    Seasonal allergies    Other specified anxiety disorders     Depression with anxiety    Personal history of other diseases of the nervous system and sense organs     History of migraine     Surgical History:  Past Surgical History:   Procedure Laterality Date    WISDOM TOOTH EXTRACTION       Family History:  Family History   Problem Relation Name Age of Onset    Other (prediabetes) Mother      Anxiety disorder Father      Cataracts Father       Social History:  Social History     Socioeconomic History    Marital status:       Spouse name: Liz Jaeger    Number of children: 0    Years of education: Not on file    Highest education level: Not on file   Occupational History    Occupation:    Tobacco Use    Smoking status: Never    Smokeless tobacco: Never   Vaping Use    Vaping status: Never Used   Substance and Sexual Activity    Alcohol use: Yes     Alcohol/week: 0.0 - 4.0 standard drinks of alcohol    Drug use: Never    Sexual activity: Yes     Partners: Female     Birth control/protection: OCP   Other Topics Concern    Not on file   Social History Narrative    Not on file     Social Drivers of Health     Financial Resource Strain: Not on file (4/15/2021)   Food Insecurity: No Food Insecurity (4/15/2021)    Received from [employer]    Hunger Vital Sign     Worried About Running Out of Food in the Last Year: Never true     Ran Out of Food in the Last Year: Never true   Transportation Needs: No Transportation Needs (4/15/2021)    Received from [employer]    PRAPARE - Transportation     Lack of Transportation (Medical): No     Lack of Transportation (Non-Medical): No   Physical Activity: Not on file (4/15/2021)   Stress: Not on file (4/15/2021)   Social Connections: Unknown (4/15/2021)    Received from [employer]    Social Connection and Isolation Panel [NHANES]     Frequency of Communication with Friends and Family: More than three times a week     Frequency of Social Gatherings with Friends and Family: Once a week     Attends Alevism Services: Never     Active Member of Clubs or Organizations: Yes     Attends Club or Organization Meetings: Never     Marital Status: Not on file   Intimate Partner Violence: Not At Risk (4/15/2021)    Received from [employer]    Humiliation, Afraid, Rape, and Kick questionnaire     Fear of Current or Ex-Partner: No     Emotionally Abused: No     Physically Abused: No     Sexually Abused: No   Housing Stability: Unknown (4/15/2021)    [facility]  [facility]    Housing Stability Vital Sign     Unable to Pay for Housing in the Last Year: No     Number of Places Lived in the Last Year: Not on file     Unstable Housing in the Last Year: No     Vitals:  There were no vitals filed for this visit.    Yumiko Barbosa, APRN-CNS

## 2025-03-18 PROCEDURE — RXMED WILLOW AMBULATORY MEDICATION CHARGE

## 2025-03-19 ENCOUNTER — APPOINTMENT (OUTPATIENT)
Dept: OBSTETRICS AND GYNECOLOGY | Facility: CLINIC | Age: 40
End: 2025-03-19
Payer: COMMERCIAL

## 2025-03-19 VITALS
HEIGHT: 63 IN | WEIGHT: 170 LBS | BODY MASS INDEX: 30.12 KG/M2 | DIASTOLIC BLOOD PRESSURE: 60 MMHG | SYSTOLIC BLOOD PRESSURE: 90 MMHG

## 2025-03-19 DIAGNOSIS — Z01.419 ENCOUNTER FOR GYNECOLOGICAL EXAMINATION WITHOUT ABNORMAL FINDING: Primary | ICD-10-CM

## 2025-03-19 DIAGNOSIS — N94.6 DYSMENORRHEA: ICD-10-CM

## 2025-03-19 DIAGNOSIS — N93.9 ABNORMAL UTERINE BLEEDING: ICD-10-CM

## 2025-03-19 PROCEDURE — 87624 HPV HI-RISK TYP POOLED RSLT: CPT

## 2025-03-19 PROCEDURE — 3008F BODY MASS INDEX DOCD: CPT | Performed by: OBSTETRICS & GYNECOLOGY

## 2025-03-19 PROCEDURE — 88175 CYTOPATH C/V AUTO FLUID REDO: CPT

## 2025-03-19 PROCEDURE — 99395 PREV VISIT EST AGE 18-39: CPT | Performed by: OBSTETRICS & GYNECOLOGY

## 2025-03-19 PROCEDURE — RXMED WILLOW AMBULATORY MEDICATION CHARGE

## 2025-03-19 RX ORDER — IBUPROFEN 600 MG/1
600 TABLET ORAL EVERY 6 HOURS PRN
Qty: 60 TABLET | Refills: 3 | Status: SHIPPED | OUTPATIENT
Start: 2025-03-19 | End: 2025-04-05

## 2025-03-19 RX ORDER — NORETHINDRONE ACETATE AND ETHINYL ESTRADIOL 1.5-30(21)
1 KIT ORAL DAILY
Qty: 112 TABLET | Refills: 3 | Status: SHIPPED | OUTPATIENT
Start: 2025-03-19 | End: 2026-03-19

## 2025-03-21 ENCOUNTER — PHARMACY VISIT (OUTPATIENT)
Dept: PHARMACY | Facility: CLINIC | Age: 40
End: 2025-03-21
Payer: COMMERCIAL

## 2025-03-28 LAB
CYTOLOGY CMNT CVX/VAG CYTO-IMP: NORMAL
HPV HR 12 DNA GENITAL QL NAA+PROBE: NEGATIVE
HPV HR GENOTYPES PNL CVX NAA+PROBE: NEGATIVE
HPV16 DNA SPEC QL NAA+PROBE: NEGATIVE
HPV18 DNA SPEC QL NAA+PROBE: NEGATIVE
LAB AP CONTRACEPTIVE HISTORY: NORMAL
LAB AP HPV GENOTYPE QUESTION: YES
LAB AP HPV HR: NORMAL
LABORATORY COMMENT REPORT: NORMAL
LMP START DATE: NORMAL
PATH REPORT.TOTAL CANCER: NORMAL

## 2025-05-15 ENCOUNTER — APPOINTMENT (OUTPATIENT)
Dept: BEHAVIORAL HEALTH | Facility: CLINIC | Age: 40
End: 2025-05-15
Payer: COMMERCIAL

## 2025-05-15 DIAGNOSIS — F41.1 GAD (GENERALIZED ANXIETY DISORDER): ICD-10-CM

## 2025-05-15 DIAGNOSIS — F32.5 MAJOR DEPRESSION IN REMISSION: ICD-10-CM

## 2025-05-15 DIAGNOSIS — F33.0 MILD EPISODE OF RECURRENT MAJOR DEPRESSIVE DISORDER: ICD-10-CM

## 2025-05-15 DIAGNOSIS — F41.1 GENERALIZED ANXIETY DISORDER: ICD-10-CM

## 2025-05-15 PROCEDURE — 99213 OFFICE O/P EST LOW 20 MIN: CPT | Performed by: CLINICAL NURSE SPECIALIST

## 2025-05-15 RX ORDER — BUPROPION HYDROCHLORIDE 300 MG/1
300 TABLET ORAL
Qty: 90 TABLET | Refills: 1 | Status: SHIPPED | OUTPATIENT
Start: 2025-05-15 | End: 2025-08-19

## 2025-05-15 RX ORDER — FLUOXETINE HYDROCHLORIDE 40 MG/1
80 CAPSULE ORAL DAILY
Qty: 180 CAPSULE | Refills: 1 | Status: SHIPPED | OUTPATIENT
Start: 2025-05-15 | End: 2025-08-19

## 2025-05-15 RX ORDER — ARIPIPRAZOLE 20 MG/1
20 TABLET ORAL
Qty: 90 TABLET | Refills: 1 | Status: SHIPPED | OUTPATIENT
Start: 2025-05-15 | End: 2025-08-19

## 2025-05-15 RX ORDER — LAMOTRIGINE 100 MG/1
100 TABLET ORAL 2 TIMES DAILY
Qty: 180 TABLET | Refills: 1 | Status: SHIPPED | OUTPATIENT
Start: 2025-05-15 | End: 2025-08-19

## 2025-05-15 RX ORDER — BUSPIRONE HYDROCHLORIDE 15 MG/1
15 TABLET ORAL 2 TIMES DAILY
Qty: 180 TABLET | Refills: 1 | Status: SHIPPED | OUTPATIENT
Start: 2025-05-15 | End: 2025-08-19

## 2025-05-15 NOTE — PROGRESS NOTES
Outpatient Psychiatry      Subjective   Monae Ball, a 40 y.o. female,  presents for a medication management /outpatient psychiatry routine follow up appointment as an established patient for an audio and video virtual appointment      Virtual or Telephone Consent     An interactive audio and video telecommunication system which permits real time communications between the patient (at the originating site) and provider (at the distant site) was utilized to provide this telehealth service.   Verbal consent was requested and obtained from Monae Ball on this date, 05/15/25 for a telehealth visit. She was at work for the appointment      Diagnosis:   MATTI (generalized anxiety disorder)  (F41.1) mild major depressive disorder in remission  F32.5      Problem List         Patient Active Problem List   Diagnosis    Anxiety associated with depression    Bicuspid aortic valve (HHS-HCC)    Overweight with body mass index (BMI) of 27 to 27.9 in adult    Seasonal allergies    Acne vulgaris    Aortic valve defect    Aortic valve regurgitation    Benign nevus of skin    Constipation    Current mild episode of major depressive disorder (CMS-HCC)    Moderate episode of recurrent major depressive disorder (Multi)    Obsessive-compulsive behavior    Sprain of ankle    Sprain of wrist    Abnormal uterine bleeding    Generalized anxiety disorder    Major depression    Overweight (BMI 25.0-29.9)         Treatment Plan/Recommendations:   Follow-up plan  was discussed with patient.can follow up in 10-12 weeks , can continue self care and wellness efforts and maintain routine health screenings , can call  for treatment and scheduling concerns   Psychotropic medications :  Continue abilify 20 mg , daily for depression   Continue Bupropion xl 300 mg , daily each morning for depression   Continue Buspirone 15 mg twice a day for anxiety   Continue Fluoxetine 40 mg 2 capsules daily for depression and anxiety  Continue  lamotrigine 100 mg , twice a day for depression      Review with patient: Treatment plan reviewed with the patient.  Medication risks/benefit reviewed with the patient     Hpi :  she says she is in therapy at [facility] , this is helpful , she is doing a mindfulness based therapy   she lives with her wife , things are going well at home    abilify has been on regimen for about four  years   lamotrigine was added to regimen about 5-6 years ago , when she was having issues with anxiety and intrusive thoughts and some suicidal thoughts   No recent suicidal thoughts   bupropion xl has been on regimen for 5-6 years, no side effect concerns    prozac has been on regimen for 5-6 years , at a consistent dose and she is pleased with this choice of medication  her sleep is good    no mood cycling presently  Reconciled and reviewed medication list in the [facility] emr   She listens to music for relaxation   She does yoga , has been busy has not done this as much, she has a desire to get back to doing this    has supportive people in her life   Prayer is an important sense of coping for her   She has some anxiety before she goes to work and once she gets to work and starts the day her anxiety lessens , this is manageable   She occasionally ruminates and she has been able to deal with this so it is manageable   No new otc meds or prescribed meds   No new health concerns   Most recent labs reviewed at previous appointment with me , no major concerns   No substance abuse issues      [facility] as noted above             Social History     Socioeconomic History    Marital status:      Spouse name: Liz Jaeger    Number of children: 0    Years of education: Not on file    Highest education level: Not on file   Occupational History    Occupation:    Tobacco Use    Smoking status: Never    Smokeless tobacco: Never   Vaping Use    Vaping status: Never Used   Substance and Sexual Activity    Alcohol  use: Yes     Alcohol/week: 0.0 - 4.0 standard drinks of alcohol    Drug use: Never    Sexual activity: Yes     Partners: Female     Birth control/protection: OCP   Other Topics Concern    Not on file   Social History Narrative    Not on file     Social Drivers of Health     Financial Resource Strain: Not on file (4/15/2021)   Food Insecurity: No Food Insecurity (4/15/2021)    Received from [facility]    Hunger Vital Sign     Worried About Running Out of Food in the Last Year: Never true     Ran Out of Food in the Last Year: Never true   Transportation Needs: No Transportation Needs (4/15/2021)    Received from [facility]    PRAPARE - Transportation     Lack of Transportation (Medical): No     Lack of Transportation (Non-Medical): No   Physical Activity: Not on file (4/15/2021)   Stress: Not on file (4/15/2021)   Social Connections: Unknown (4/15/2021)    Received from [facility]    Social Connection and Isolation Panel [NHANES]     Frequency of Communication with Friends and Family: More than three times a week     Frequency of Social Gatherings with Friends and Family: Once a week     Attends Latter day Services: Never     Active Member of Clubs or Organizations: Yes     Attends Club or Organization Meetings: Never     Marital Status: Not on file   Intimate Partner Violence: Not At Risk (4/15/2021)    Received from [facility]    Humiliation, Afraid, Rape, and Kick questionnaire     Fear of Current or Ex-Partner: No     Emotionally Abused: No     Physically Abused: No     Sexually Abused: No   Housing Stability: Unknown (4/15/2021)    Received from [facility]    Housing Stability Vital Sign     Unable to Pay for Housing in the Last Year: No     Number of Places Lived in the Last Year: Not on file     Unstable Housing in the Last Year: No     Vitals:  There were no vitals filed for this visit.    Yumiko Barbosa, APRN-CNS

## 2025-05-20 PROCEDURE — RXMED WILLOW AMBULATORY MEDICATION CHARGE

## 2025-05-21 ENCOUNTER — PHARMACY VISIT (OUTPATIENT)
Dept: PHARMACY | Facility: CLINIC | Age: 40
End: 2025-05-21
Payer: COMMERCIAL

## 2025-06-17 ENCOUNTER — APPOINTMENT (OUTPATIENT)
Dept: PRIMARY CARE | Facility: CLINIC | Age: 40
End: 2025-06-17
Payer: COMMERCIAL

## 2025-06-25 ASSESSMENT — PROMIS GLOBAL HEALTH SCALE
RATE_QUALITY_OF_LIFE: GOOD
CARRYOUT_SOCIAL_ACTIVITIES: GOOD
RATE_AVERAGE_PAIN: 0
RATE_MENTAL_HEALTH: FAIR
EMOTIONAL_PROBLEMS: ALWAYS
CARRYOUT_PHYSICAL_ACTIVITIES: MODERATELY
RATE_GENERAL_HEALTH: GOOD
RATE_SOCIAL_SATISFACTION: FAIR
RATE_AVERAGE_FATIGUE: MODERATE
RATE_PHYSICAL_HEALTH: FAIR

## 2025-06-26 ENCOUNTER — APPOINTMENT (OUTPATIENT)
Dept: PRIMARY CARE | Facility: CLINIC | Age: 40
End: 2025-06-26
Payer: COMMERCIAL

## 2025-06-26 VITALS
TEMPERATURE: 98.4 F | BODY MASS INDEX: 30.18 KG/M2 | RESPIRATION RATE: 14 BRPM | DIASTOLIC BLOOD PRESSURE: 63 MMHG | OXYGEN SATURATION: 95 % | HEART RATE: 89 BPM | SYSTOLIC BLOOD PRESSURE: 92 MMHG | WEIGHT: 170.3 LBS | HEIGHT: 63 IN

## 2025-06-26 DIAGNOSIS — Z13.29 SCREENING FOR THYROID DISORDER: ICD-10-CM

## 2025-06-26 DIAGNOSIS — E66.09 CLASS 1 OBESITY DUE TO EXCESS CALORIES WITHOUT SERIOUS COMORBIDITY WITH BODY MASS INDEX (BMI) OF 30.0 TO 30.9 IN ADULT: ICD-10-CM

## 2025-06-26 DIAGNOSIS — Z13.220 SCREENING FOR LIPID DISORDERS: ICD-10-CM

## 2025-06-26 DIAGNOSIS — Q23.81 BICUSPID AORTIC VALVE: ICD-10-CM

## 2025-06-26 DIAGNOSIS — Z00.00 ROUTINE GENERAL MEDICAL EXAMINATION AT A HEALTH CARE FACILITY: Primary | ICD-10-CM

## 2025-06-26 DIAGNOSIS — F41.8 ANXIETY ASSOCIATED WITH DEPRESSION: ICD-10-CM

## 2025-06-26 DIAGNOSIS — J30.2 SEASONAL ALLERGIES: ICD-10-CM

## 2025-06-26 DIAGNOSIS — Z13.1 SCREENING FOR DIABETES MELLITUS: ICD-10-CM

## 2025-06-26 DIAGNOSIS — E66.811 CLASS 1 OBESITY DUE TO EXCESS CALORIES WITHOUT SERIOUS COMORBIDITY WITH BODY MASS INDEX (BMI) OF 30.0 TO 30.9 IN ADULT: ICD-10-CM

## 2025-06-26 PROBLEM — E66.3 OVERWEIGHT WITH BODY MASS INDEX (BMI) OF 27 TO 27.9 IN ADULT: Status: RESOLVED | Noted: 2023-05-26 | Resolved: 2025-06-26

## 2025-06-26 PROBLEM — E66.3 OVERWEIGHT (BMI 25.0-29.9): Status: RESOLVED | Noted: 2023-11-03 | Resolved: 2025-06-26

## 2025-06-26 PROCEDURE — 1036F TOBACCO NON-USER: CPT | Performed by: INTERNAL MEDICINE

## 2025-06-26 PROCEDURE — RXMED WILLOW AMBULATORY MEDICATION CHARGE

## 2025-06-26 PROCEDURE — 3008F BODY MASS INDEX DOCD: CPT | Performed by: INTERNAL MEDICINE

## 2025-06-26 PROCEDURE — 99395 PREV VISIT EST AGE 18-39: CPT | Performed by: INTERNAL MEDICINE

## 2025-06-26 ASSESSMENT — ENCOUNTER SYMPTOMS
SEIZURES: 0
JOINT SWELLING: 0
EYE ITCHING: 0
BRUISES/BLEEDS EASILY: 0
VOICE CHANGE: 0
SINUS PRESSURE: 0
FLANK PAIN: 0
DYSURIA: 0
WEAKNESS: 0
NERVOUS/ANXIOUS: 1
CONSTIPATION: 0
COLOR CHANGE: 0
APPETITE CHANGE: 0
SORE THROAT: 0
DIZZINESS: 0
STRIDOR: 0
AGITATION: 0
DIARRHEA: 0
SINUS PAIN: 0
ARTHRALGIAS: 1
MYALGIAS: 0
PHOTOPHOBIA: 0
POLYDIPSIA: 0
NAUSEA: 0
FEVER: 0
TREMORS: 0
FREQUENCY: 0
WHEEZING: 0
TROUBLE SWALLOWING: 0
POLYPHAGIA: 0
EYE REDNESS: 0
HYPERACTIVE: 0
HEMATURIA: 0
LIGHT-HEADEDNESS: 0
FACIAL ASYMMETRY: 0
SPEECH DIFFICULTY: 0
WOUND: 0
SHORTNESS OF BREATH: 0
ABDOMINAL PAIN: 0
CHOKING: 0
CHEST TIGHTNESS: 0
EYE DISCHARGE: 0
NECK STIFFNESS: 0
RHINORRHEA: 0
HALLUCINATIONS: 0
CHILLS: 0
CONFUSION: 0
UNEXPECTED WEIGHT CHANGE: 1
DIAPHORESIS: 0
DIFFICULTY URINATING: 0
SLEEP DISTURBANCE: 0
BLOOD IN STOOL: 0
BACK PAIN: 0
DYSPHORIC MOOD: 0
ANAL BLEEDING: 0
FACIAL SWELLING: 0
RECTAL PAIN: 0
NECK PAIN: 0
EYE PAIN: 0
ABDOMINAL DISTENTION: 0
APNEA: 0
VOMITING: 0
PALPITATIONS: 0
COUGH: 0
ADENOPATHY: 0
NUMBNESS: 0
ACTIVITY CHANGE: 0
HEADACHES: 0
DECREASED CONCENTRATION: 0
FATIGUE: 0

## 2025-06-26 ASSESSMENT — ANXIETY QUESTIONNAIRES
4. TROUBLE RELAXING: SEVERAL DAYS
2. NOT BEING ABLE TO STOP OR CONTROL WORRYING: NEARLY EVERY DAY
5. BEING SO RESTLESS THAT IT IS HARD TO SIT STILL: NOT AT ALL
7. FEELING AFRAID AS IF SOMETHING AWFUL MIGHT HAPPEN: NEARLY EVERY DAY
1. FEELING NERVOUS, ANXIOUS, OR ON EDGE: NEARLY EVERY DAY
GAD7 TOTAL SCORE: 14
3. WORRYING TOO MUCH ABOUT DIFFERENT THINGS: NEARLY EVERY DAY
6. BECOMING EASILY ANNOYED OR IRRITABLE: SEVERAL DAYS
IF YOU CHECKED OFF ANY PROBLEMS ON THIS QUESTIONNAIRE, HOW DIFFICULT HAVE THESE PROBLEMS MADE IT FOR YOU TO DO YOUR WORK, TAKE CARE OF THINGS AT HOME, OR GET ALONG WITH OTHER PEOPLE: SOMEWHAT DIFFICULT

## 2025-06-26 ASSESSMENT — PATIENT HEALTH QUESTIONNAIRE - PHQ9
1. LITTLE INTEREST OR PLEASURE IN DOING THINGS: NOT AT ALL
SUM OF ALL RESPONSES TO PHQ9 QUESTIONS 1 AND 2: 0
2. FEELING DOWN, DEPRESSED OR HOPELESS: NOT AT ALL

## 2025-06-26 NOTE — PROGRESS NOTES
Subjective   Patient ID: Monae Ball is a 40 y.o. female who presents for Annual Exam.    HPI  Pt here for annual.  She is up in weight-she is up 12 lbs since last year.  She admits to stress eating.  She walks daily for exercise.  She is on psych meds that cause some weight gain (abilify).    She sees GYN for annuals-had visit already this year.  No pelvic or urinary issues. No breast/pelvic issues.      She is very anxious.  She is seeing psych/counselor.  The counselor twice a month and psych every 3 months.  She is on 4 different meds.  She tells me her depression is in remission but anxiety is the issue.  Sleep is fair.  No panic attacks recently.        Review of Systems   Constitutional:  Positive for unexpected weight change. Negative for activity change, appetite change, chills, diaphoresis, fatigue and fever.   HENT:  Negative for congestion, dental problem, drooling, ear discharge, ear pain, facial swelling, hearing loss, mouth sores, nosebleeds, postnasal drip, rhinorrhea, sinus pressure, sinus pain, sneezing, sore throat, tinnitus, trouble swallowing and voice change.    Eyes:  Negative for photophobia, pain, discharge, redness, itching and visual disturbance (wears glasses-up to date on exam).   Respiratory:  Negative for apnea, cough, choking, chest tightness, shortness of breath, wheezing and stridor.    Cardiovascular:  Negative for chest pain, palpitations and leg swelling.   Gastrointestinal:  Negative for abdominal distention, abdominal pain, anal bleeding, blood in stool, constipation, diarrhea, nausea, rectal pain and vomiting.   Endocrine: Negative for cold intolerance, heat intolerance, polydipsia, polyphagia and polyuria.   Genitourinary:  Negative for decreased urine volume, difficulty urinating, dyspareunia, dysuria, enuresis, flank pain, frequency, genital sores, hematuria, menstrual problem, pelvic pain, urgency, vaginal bleeding, vaginal discharge and vaginal pain.  "  Musculoskeletal:  Positive for arthralgias (wrists at times). Negative for back pain, gait problem, joint swelling, myalgias, neck pain and neck stiffness.   Skin:  Negative for color change, pallor, rash and wound.   Allergic/Immunologic: Positive for environmental allergies. Negative for food allergies and immunocompromised state.   Neurological:  Negative for dizziness, tremors, seizures, syncope, facial asymmetry, speech difficulty, weakness, light-headedness, numbness and headaches.   Hematological:  Negative for adenopathy. Does not bruise/bleed easily.   Psychiatric/Behavioral:  Negative for agitation, behavioral problems, confusion, decreased concentration, dysphoric mood, hallucinations, self-injury, sleep disturbance and suicidal ideas. The patient is nervous/anxious. The patient is not hyperactive.        Objective   BP 92/63 (BP Location: Right arm, Patient Position: Sitting)   Pulse 89   Temp 36.9 °C (98.4 °F) (Tympanic)   Resp 14   Ht 1.6 m (5' 3\")   Wt 77.2 kg (170 lb 4.8 oz)   SpO2 95%   BMI 30.17 kg/m²    Physical Exam  Constitutional:       Appearance: Normal appearance. She is obese.   HENT:      Head: Normocephalic and atraumatic.      Right Ear: Tympanic membrane, ear canal and external ear normal. There is no impacted cerumen.      Left Ear: Tympanic membrane, ear canal and external ear normal. There is no impacted cerumen.      Nose: Nose normal. No congestion or rhinorrhea.      Mouth/Throat:      Mouth: Mucous membranes are moist.      Pharynx: Oropharynx is clear. No oropharyngeal exudate or posterior oropharyngeal erythema.   Eyes:      Extraocular Movements: Extraocular movements intact.      Conjunctiva/sclera: Conjunctivae normal.      Pupils: Pupils are equal, round, and reactive to light.   Neck:      Vascular: No carotid bruit.   Cardiovascular:      Rate and Rhythm: Normal rate and regular rhythm.      Pulses: Normal pulses.      Heart sounds: Normal heart sounds. No murmur " heard.  Pulmonary:      Effort: Pulmonary effort is normal. No respiratory distress.      Breath sounds: Normal breath sounds. No wheezing, rhonchi or rales.   Abdominal:      General: Abdomen is flat. Bowel sounds are normal. There is no distension.      Palpations: Abdomen is soft.      Tenderness: There is no abdominal tenderness.      Hernia: No hernia is present.   Musculoskeletal:         General: No swelling or tenderness. Normal range of motion.      Cervical back: Normal range of motion and neck supple.      Right lower leg: No edema.      Left lower leg: No edema.   Lymphadenopathy:      Cervical: No cervical adenopathy.   Skin:     General: Skin is warm and dry.      Findings: No lesion or rash.   Neurological:      General: No focal deficit present.      Mental Status: She is alert and oriented to person, place, and time.      Cranial Nerves: No cranial nerve deficit.      Sensory: No sensory deficit.      Motor: No weakness.   Psychiatric:         Mood and Affect: Mood normal.         Behavior: Behavior normal.         Thought Content: Thought content normal.         Judgment: Judgment normal.         Assessment/Plan   Problem List Items Addressed This Visit       Anxiety associated with depression    Sees psych and counselor   Discussed how meds could be cause of weight gain (Abilify) and to talk to psych about this  Advised pt to not stop any meds without discussion with psych            RESOLVED: Bicuspid aortic valve    No current issues  Saw cardiology when younger          Seasonal allergies    On meds as needed         Class 1 obesity due to excess calories without serious comorbidity with body mass index (BMI) of 30.0 to 30.9 in adult    Pt on psych meds that cause weight gain  Mood not great-admits to stress eating  Recommend exercising regularly  Low carb/low sugar diet             Other Visit Diagnoses         Routine general medical examination at a health care facility    -  Primary     Relevant Orders    Comprehensive Metabolic Panel    CBC    Follow Up In Primary Care - Health Maintenance      Screening for lipid disorders        Relevant Orders    Lipid Panel      Screening for thyroid disorder        Relevant Orders    TSH with reflex to Free T4 if abnormal      Screening for diabetes mellitus        Relevant Orders    Hemoglobin A1C

## 2025-06-26 NOTE — PATIENT INSTRUCTIONS
Get fasting blood work done when able-orders given  See psychiatrist/counselor as indicated  See GYN annually for exam   Work hard for weight loss-healthy diet-lean protein/fish/veggies and lower carbs/sugars  Exercise regularly-goal is 150 minutes/week   Flu/covid booster if interested in the fall  Follow up here in 1 year -sooner if needed

## 2025-06-26 NOTE — ASSESSMENT & PLAN NOTE
Sees psych and counselor   Discussed how meds could be cause of weight gain (Abilify) and to talk to psych about this  Advised pt to not stop any meds without discussion with psych

## 2025-06-27 ENCOUNTER — PHARMACY VISIT (OUTPATIENT)
Dept: PHARMACY | Facility: CLINIC | Age: 40
End: 2025-06-27
Payer: COMMERCIAL

## 2025-06-29 LAB
ALBUMIN SERPL-MCNC: 4.1 G/DL (ref 3.6–5.1)
ALP SERPL-CCNC: 84 U/L (ref 31–125)
ALT SERPL-CCNC: 14 U/L (ref 6–29)
ANION GAP SERPL CALCULATED.4IONS-SCNC: 8 MMOL/L (CALC) (ref 7–17)
AST SERPL-CCNC: 17 U/L (ref 10–30)
BILIRUB SERPL-MCNC: 0.5 MG/DL (ref 0.2–1.2)
BUN SERPL-MCNC: 10 MG/DL (ref 7–25)
CALCIUM SERPL-MCNC: 8.6 MG/DL (ref 8.6–10.2)
CHLORIDE SERPL-SCNC: 105 MMOL/L (ref 98–110)
CHOLEST SERPL-MCNC: 152 MG/DL
CHOLEST/HDLC SERPL: 3 (CALC)
CO2 SERPL-SCNC: 24 MMOL/L (ref 20–32)
CREAT SERPL-MCNC: 0.74 MG/DL (ref 0.5–0.97)
EGFRCR SERPLBLD CKD-EPI 2021: 106 ML/MIN/1.73M2
ERYTHROCYTE [DISTWIDTH] IN BLOOD BY AUTOMATED COUNT: 12.3 % (ref 11–15)
EST. AVERAGE GLUCOSE BLD GHB EST-MCNC: 105 MG/DL
EST. AVERAGE GLUCOSE BLD GHB EST-SCNC: 5.8 MMOL/L
GLUCOSE SERPL-MCNC: 81 MG/DL (ref 65–99)
HBA1C MFR BLD: 5.3 %
HCT VFR BLD AUTO: 44.5 % (ref 35–45)
HDLC SERPL-MCNC: 51 MG/DL
HGB BLD-MCNC: 14.1 G/DL (ref 11.7–15.5)
LDLC SERPL CALC-MCNC: 80 MG/DL (CALC)
MCH RBC QN AUTO: 30.1 PG (ref 27–33)
MCHC RBC AUTO-ENTMCNC: 31.7 G/DL (ref 32–36)
MCV RBC AUTO: 94.9 FL (ref 80–100)
NONHDLC SERPL-MCNC: 101 MG/DL (CALC)
PLATELET # BLD AUTO: 300 THOUSAND/UL (ref 140–400)
PMV BLD REES-ECKER: 11.5 FL (ref 7.5–12.5)
POTASSIUM SERPL-SCNC: 4.3 MMOL/L (ref 3.5–5.3)
PROT SERPL-MCNC: 6.8 G/DL (ref 6.1–8.1)
RBC # BLD AUTO: 4.69 MILLION/UL (ref 3.8–5.1)
SODIUM SERPL-SCNC: 137 MMOL/L (ref 135–146)
TRIGL SERPL-MCNC: 111 MG/DL
TSH SERPL-ACNC: 0.97 MIU/L
WBC # BLD AUTO: 6.7 THOUSAND/UL (ref 3.8–10.8)

## 2025-06-30 ENCOUNTER — DOCUMENTATION (OUTPATIENT)
Dept: BEHAVIORAL HEALTH | Facility: CLINIC | Age: 40
End: 2025-06-30
Payer: COMMERCIAL

## 2025-06-30 RX ORDER — BUSPIRONE HYDROCHLORIDE 15 MG/1
15 TABLET ORAL 2 TIMES DAILY
COMMUNITY
End: 2025-06-30 | Stop reason: SDUPTHER

## 2025-06-30 RX ORDER — BUSPIRONE HYDROCHLORIDE 15 MG/1
7.5 TABLET ORAL DAILY
COMMUNITY

## 2025-06-30 NOTE — PROGRESS NOTES
Nonbillable note : reconciled and updated [facility] emr med list with buspirone increased dose ( 15 mg , each morning and 7.5 mg , each afternoon and 15 mg each night at bedtime ) kpacer cns

## 2025-07-14 NOTE — PROGRESS NOTES
Subjective   Monae Ball is a 40 y.o. female here for a routine exam.  She has regular cycles on her birth control pills, she does note cramping.    She has no discharge, no dysuria or change in bowel habits.  She is  to a female partner and has no concerns for infection.    Personal health questionnaire reviewed: yes.     Gynecologic History  Patient's last menstrual period was 2025 (exact date).  Contraception: OCP (estrogen/progesterone)  Last Pap: 21. Results were: normal      Obstetric History  OB History    Para Term  AB Living   0 0 0 0 0 0   SAB IAB Ectopic Multiple Live Births   0 0 0 0 0       Objective   Constitutional: Alert and in no acute distress. Well developed, well nourished.   Head and Face: Head and face: Normal.    Eyes: Normal external exam - nonicteric sclera, extraocular movements intact (EOMI) and no ptosis.   Neck: No neck asymmetry. Supple. Thyroid not enlarged and there were no palpable thyroid nodules.    Pulmonary: No respiratory distress.   Chest: Breasts: Normal appearance, no nipple discharge and no skin changes. Palpation of breasts and axillae: No palpable mass and no axillary lymphadenopathy.   Abdomen: Soft nontender; no abdominal mass palpated. No organomegaly. No hernias.   Genitourinary: External genitalia: Normal. No inguinal lymphadenopathy. Bartholin's Urethral and Skenes Glands: Normal. Urethra: Normal.  Bladder: Normal on palpation. Vagina: Normal. Cervix: Normal.  Uterus: Normal.  Right Adnexa/parametria: Normal.  Left Adnexa/parametria: Normal.    Musculoskeletal: No joint swelling seen, normal movements of all extremities.   Skin: Normal skin color and pigmentation, normal skin turgor, and no rash.   Neurologic: Non-focal. Grossly intact.   Psychiatric: Alert and oriented x 3. Affect normal to patient baseline. Mood: Appropriate.  Physical Exam     Assessment/Plan   Healthy female exam.  This is a 40-year-old female with a normal  exam.  A Pap smear was sent.    We discussed the cramping with her menses.  I recommend a trial of continuous use.  She will skip the placebo row in the pack and proceed to the next pack.  I typically recommend allowing a cycle every 3 months.  I did order ibuprofen 600 mg to the pharmacy for dysmenorrhea.  I recommend alternating with Tylenol and heat.    I will see her routinely in 1 year.  Education reviewed: self breast exams.  Contraception: OCP (estrogen/progesterone).   (1) body pink, extremities blue

## 2025-08-25 PROCEDURE — RXMED WILLOW AMBULATORY MEDICATION CHARGE

## 2025-08-26 ENCOUNTER — PHARMACY VISIT (OUTPATIENT)
Dept: PHARMACY | Facility: CLINIC | Age: 40
End: 2025-08-26
Payer: COMMERCIAL

## 2025-09-11 ENCOUNTER — APPOINTMENT (OUTPATIENT)
Dept: BEHAVIORAL HEALTH | Facility: CLINIC | Age: 40
End: 2025-09-11
Payer: COMMERCIAL

## 2025-09-18 ENCOUNTER — APPOINTMENT (OUTPATIENT)
Dept: BEHAVIORAL HEALTH | Facility: CLINIC | Age: 40
End: 2025-09-18
Payer: COMMERCIAL

## 2026-04-15 ENCOUNTER — APPOINTMENT (OUTPATIENT)
Dept: OBSTETRICS AND GYNECOLOGY | Facility: CLINIC | Age: 41
End: 2026-04-15
Payer: COMMERCIAL

## 2026-06-29 ENCOUNTER — APPOINTMENT (OUTPATIENT)
Dept: PRIMARY CARE | Facility: CLINIC | Age: 41
End: 2026-06-29
Payer: COMMERCIAL